# Patient Record
Sex: MALE | Race: WHITE | NOT HISPANIC OR LATINO | Employment: OTHER | URBAN - METROPOLITAN AREA
[De-identification: names, ages, dates, MRNs, and addresses within clinical notes are randomized per-mention and may not be internally consistent; named-entity substitution may affect disease eponyms.]

---

## 2021-12-03 ENCOUNTER — TRANSFERRED RECORDS (OUTPATIENT)
Dept: HEALTH INFORMATION MANAGEMENT | Facility: CLINIC | Age: 81
End: 2021-12-03

## 2022-05-05 ENCOUNTER — TRANSFERRED RECORDS (OUTPATIENT)
Dept: HEALTH INFORMATION MANAGEMENT | Facility: CLINIC | Age: 82
End: 2022-05-05

## 2022-10-11 NOTE — PROGRESS NOTES
2000 CEIOL OU Dr. Yanes  2002 SLT OU  2006 trab [facility] with vitrectomy OD Dr. Burnett  2007 secondary IOL OD Dr. Burnett  2011 SLT OS Dr. Burnett  2014 bleb revision OD Dr. Burnett    Chief Complaint/Presenting Concern: transfer of care     History of Present Illness:   Richar Ro is a 82 year old patient who presents for annual complete eye exam and to transfer glaucoma care. Feels vision is stable - poor in right eye with small area of vision in the infranasal aspect of visual field. Left eye has very good vision but some difficulty reading fine print.     Relevant Past Medical/Family/Social History: father AMD, DM    Relevant Review of Systems: negative     Diagnosis: Primary open angle glaucoma   Year diagnosis: 1990s  Previous glaucoma surgery/laser:   2000 CEIOL OU Dr. Yanes   2002 SLT OU   2006 trab [facility] with vitrectomy OD Dr. Burnett   2007 secondary IOL OD Dr. Burnett   2011 SLT OS Dr. Burnett   2014 bleb revision OD Dr. Burnett  Maximum intraocular pressure 33/44  Currently Meds:    timolol QAM OS   vyzulta QHS OS  Family history: negative  CCT: 609/559  Refractive status: Axial myopia (32mm OD)   Trauma history: negative  Steroid exposure: negative  Vasospastic disease: Migrane/Raynaud phenomenon: negative  A past hemodynamic crisis or Low BP:: negative  Meds AEs/intolerance: brimonidine, brinzolamide, dorzolamide, rocklatan, rhopressa  PMHx: Asthma and respiratory problems/Cardiac/Renal/Kidney stones/Sulfa Allergy  Anticoagulants: None  Today's testing:  Visual field October 13 2022:  Right eye - unable, reliable;  Left eye - inferior nasal step, very unreliable 85% FP  OCT Optic Nerve RNFL Spectralis October 13, 2022  right eye: poor quality, macula with thickening from ERM  left eye: poor quality, macula with thickening from ERM    Additional Ocular History:   ERM each eye   History of Hypotony right eye   Inferotemporal PCIOL subluxation right eye   Aphakia left eye      Plan/Recommendations:    Discussed findings with patient. Glaucoma ijn the setting of high myopia. S/p Trab right eye and on drops left eye. Asked the patient to share prior VF and OCT records to establish any progression. For now IOP stable, will continue same drops.     Continue Timolol QAM left eye     Continue vyzulta QHS left eye     RTC in 3-4 months      Jerrica Gonzalez MD  Resident Physician - PGY3  Department of Ophthalmology   [facility]      Physician Attestation     Attending Physician Attestation:  Complete documentation of historical and exam elements from today's encounter can be found in the full encounter summary report (not reduplicated in this progress note). I personally obtained the chief complaint(s) and history of present illness. I confirmed and edited as necessary the review of systems, past medical/surgical history, family history, social history, and examination findings as documented by others; and I examined the patient myself. I personally reviewed the relevant tests, images, and reports as documented above. I personally reviewed the ophthalmic test(s) associated with this encounter, agree with the interpretation(s) as documented by the resident/fellow and have edited the corresponding report(s) as necessary. I formulated and edited as necessary the assessment and plan and discussed the findings and management plan with the patient and any family members present at the time of the visit.  Hammad Moya M.D., Glaucoma, October 13, 2022

## 2022-10-12 DIAGNOSIS — H40.1133 PRIMARY OPEN ANGLE GLAUCOMA (POAG) OF BOTH EYES, SEVERE STAGE: Primary | ICD-10-CM

## 2022-10-13 ENCOUNTER — OFFICE VISIT (OUTPATIENT)
Dept: OPHTHALMOLOGY | Facility: CLINIC | Age: 82
End: 2022-10-13
Attending: OPHTHALMOLOGY
Payer: COMMERCIAL

## 2022-10-13 DIAGNOSIS — H40.1133 PRIMARY OPEN ANGLE GLAUCOMA (POAG) OF BOTH EYES, SEVERE STAGE: ICD-10-CM

## 2022-10-13 PROCEDURE — G0463 HOSPITAL OUTPT CLINIC VISIT: HCPCS | Mod: 25

## 2022-10-13 PROCEDURE — 76514 ECHO EXAM OF EYE THICKNESS: CPT | Performed by: OPHTHALMOLOGY

## 2022-10-13 PROCEDURE — 92133 CPTRZD OPH DX IMG PST SGM ON: CPT | Performed by: OPHTHALMOLOGY

## 2022-10-13 PROCEDURE — 92083 EXTENDED VISUAL FIELD XM: CPT | Performed by: OPHTHALMOLOGY

## 2022-10-13 PROCEDURE — 92002 INTRM OPH EXAM NEW PATIENT: CPT | Mod: GC | Performed by: OPHTHALMOLOGY

## 2022-10-13 RX ORDER — ATORVASTATIN CALCIUM 20 MG/1
20 TABLET, FILM COATED ORAL DAILY
COMMUNITY
Start: 2022-07-17

## 2022-10-13 RX ORDER — TIMOLOL MALEATE 5 MG/ML
1 SOLUTION/ DROPS OPHTHALMIC DAILY
COMMUNITY
Start: 2021-12-24 | End: 2022-10-13

## 2022-10-13 RX ORDER — LATANOPROSTENE BUNOD 0.24 MG/ML
1 SOLUTION/ DROPS OPHTHALMIC AT BEDTIME
COMMUNITY
Start: 2022-08-01 | End: 2022-10-13

## 2022-10-13 RX ORDER — LOSARTAN POTASSIUM 25 MG/1
25 TABLET ORAL DAILY
COMMUNITY
Start: 2022-10-04

## 2022-10-13 ASSESSMENT — VISUAL ACUITY
OS_SC: 20/20
METHOD: SNELLEN - LINEAR
OS_SC+: -2
OD_SC: 20/600
CORRECTION_TYPE: GLASSES
OD_PH_SC: 20/500

## 2022-10-13 ASSESSMENT — REFRACTION_WEARINGRX
OS_ADD: +2.50
SPECS_TYPE: BIFOCAL
OS_SPHERE: PLANO
OD_SPHERE: PLANO
OS_AXIS: 150
OS_CYLINDER: +1.75
OD_ADD: +2.50
OD_CYLINDER: SPHERE

## 2022-10-13 ASSESSMENT — TONOMETRY
IOP_METHOD: TONOPEN
OD_IOP_MMHG: 10
OS_IOP_MMHG: 12

## 2022-10-13 ASSESSMENT — PACHYMETRY
OS_CT(UM): 559
OD_CT(UM): 608

## 2022-10-13 ASSESSMENT — SLIT LAMP EXAM - LIDS
COMMENTS: MGD
COMMENTS: MGD

## 2022-10-13 NOTE — NURSING NOTE
Chief Complaints and History of Present Illnesses   Patient presents with     Glaucoma Evaluation     Chief Complaint(s) and History of Present Illness(es)     Glaucoma Evaluation            Laterality: both eyes    Associated symptoms: Negative for eye pain, flashes and floaters    Compliance with Treatment: always          Comments    Here for 2nd opinion glaucoma. Last eye exam was 6 months ago. Vision in the left is doing fine. No changes in vision in the right eye. Uses timolol qam left eye and vyzulta at bedtime left eye. No pain.    Fox Armando COT 8:03 AM October 13, 2022

## 2022-10-16 RX ORDER — TIMOLOL MALEATE 5 MG/ML
1 SOLUTION/ DROPS OPHTHALMIC DAILY
Qty: 15 ML | Refills: 11 | Status: SHIPPED | OUTPATIENT
Start: 2022-10-16 | End: 2024-02-07

## 2022-10-16 RX ORDER — LATANOPROSTENE BUNOD 0.24 MG/ML
1 SOLUTION/ DROPS OPHTHALMIC AT BEDTIME
Qty: 5 ML | Refills: 11 | Status: SHIPPED | OUTPATIENT
Start: 2022-10-16 | End: 2023-09-10

## 2022-11-18 ENCOUNTER — TELEPHONE (OUTPATIENT)
Dept: OPHTHALMOLOGY | Facility: CLINIC | Age: 82
End: 2022-11-18

## 2022-11-18 NOTE — TELEPHONE ENCOUNTER
Rx last sent 10-    PA team may not be able to process prior authorization until actual time needed.    Will forward to PA team to review if able to start PA process per request below for Ismael Higgins RN 9:46 AM 11/18/22

## 2022-11-18 NOTE — TELEPHONE ENCOUNTER
Prior Authorization Not Needed per Insurance    Medication: VYZULTA 0.024 % SOLN ophthalmic solution--NO PA NEEDED  Insurance Company: [employer] - Phone [phone] Fax [phone]  Expected CoPay:      Pharmacy Filling the Rx: [id number] - [place], [place] - [address] [facility]  Pharmacy Notified: Yes  Patient Notified: Yes **Instructed pharmacy to notify patient when script is ready to /ship.**    Unable to renew PA currently.

## 2022-11-18 NOTE — TELEPHONE ENCOUNTER
[facility] Call Center    Phone Message    May a detailed message be left on voicemail: yes     Reason for Call: Medication Question or concern regarding medication   Prescription Clarification  Name of Medication: VYZULTA 0.024 % SOLN ophthalmic solution  Prescribing Provider: Dr. Moya   Pharmacy: [id number] - [place], [place] - [address]   What on the order needs clarification? Pt states he received a letter from [facility] stating that the approval for this Rx is expiring soon. He is requesting to have the approval process restarted as he will need this refilled before his appointment in February. Please call pt with any questions. Thank you.    Action Taken: Message routed to:  [facility] ([facility] EYE    Travel Screening: Not Applicable

## 2023-02-14 ENCOUNTER — OFFICE VISIT (OUTPATIENT)
Dept: OPHTHALMOLOGY | Facility: CLINIC | Age: 83
End: 2023-02-14
Attending: OPHTHALMOLOGY
Payer: COMMERCIAL

## 2023-02-14 DIAGNOSIS — H40.1133 PRIMARY OPEN ANGLE GLAUCOMA (POAG) OF BOTH EYES, SEVERE STAGE: Primary | ICD-10-CM

## 2023-02-14 PROCEDURE — 92083 EXTENDED VISUAL FIELD XM: CPT | Performed by: OPHTHALMOLOGY

## 2023-02-14 PROCEDURE — G0463 HOSPITAL OUTPT CLINIC VISIT: HCPCS

## 2023-02-14 PROCEDURE — 99213 OFFICE O/P EST LOW 20 MIN: CPT | Performed by: OPHTHALMOLOGY

## 2023-02-14 ASSESSMENT — CONF VISUAL FIELD
OS_SUPERIOR_NASAL_RESTRICTION: 0
OD_INFERIOR_NASAL_RESTRICTION: 3
OS_NORMAL: 1
OS_SUPERIOR_TEMPORAL_RESTRICTION: 0
METHOD: COUNTING FINGERS
OD_SUPERIOR_NASAL_RESTRICTION: 3
OS_INFERIOR_TEMPORAL_RESTRICTION: 0
OS_INFERIOR_NASAL_RESTRICTION: 0
OD_INFERIOR_TEMPORAL_RESTRICTION: 3
OD_SUPERIOR_TEMPORAL_RESTRICTION: 3

## 2023-02-14 ASSESSMENT — REFRACTION_WEARINGRX
SPECS_TYPE: BIFOCAL
OS_SPHERE: PLANO
OS_CYLINDER: +1.75
OD_ADD: +2.50
OD_CYLINDER: SPHERE
OS_AXIS: 150
OS_ADD: +2.50
OD_SPHERE: PLANO

## 2023-02-14 ASSESSMENT — VISUAL ACUITY
OS_CC: 20/25
OD_CC: CF @ 1'
METHOD: SNELLEN - LINEAR
CORRECTION_TYPE: GLASSES

## 2023-02-14 ASSESSMENT — REFRACTION_MANIFEST
OS_ADD: +2.50
OD_ADD: +2.50
OD_CYLINDER: SPHERE
OD_SPHERE: +5.25
OS_AXIS: 145
OS_SPHERE: +0.50
OS_CYLINDER: +2.00

## 2023-02-14 ASSESSMENT — SLIT LAMP EXAM - LIDS
COMMENTS: MGD
COMMENTS: MGD

## 2023-02-14 NOTE — PROGRESS NOTES
CEIOL OU Dr. Yanes   SLT OU  2006 trab [facility] with vitrectomy OD Dr. Burnett  2007 secondary IOL OD Dr. Burnett   SLT OS Dr. Burnett   bleb revision OD Dr. Burnett    Chief Complaint/Presenting Concern: Glaucoma follow up     History of Present Illness:   Richar Ro is a 82 year old patient who presents for glaucoma follow up. Feels vision is stable - poor in right eye with small area of vision in the infranasal aspect of visual field. Left eye has very good vision but some difficulty reading fine print.     Relevant Past Medical/Family/Social History: father AMD, DM    Relevant Review of Systems: negative     Diagnosis: Primary open angle glaucoma   Year diagnosis:   Previous glaucoma surgery/laser:    CEIOL OU Dr. Yanes    SLT OU   2006 trab [facility] with vitrectomy OD Dr. Burnett    secondary IOL OD Dr. Burnett    SLT OS Dr. Burnett    bleb revision OD Dr. Burnett  Maximum intraocular pressure 33/44  Currently Meds:    timolol QAM OS   vyzulta QHS OS  Family history: negative  CCT: 609/559  Refractive status: Axial myopia (32mm OD)   Trauma history: negative  Steroid exposure: negative  Vasospastic disease: Migrane/Raynaud phenomenon: negative  A past hemodynamic crisis or Low BP:: negative  Meds AEs/intolerance: brimonidine, brinzolamide, dorzolamide, rocklatan, rhopressa  PMHx: Asthma and respiratory problems/Cardiac/Renal/Kidney stones/Sulfa Allergy  Anticoagulants: None  Prior Testing:  OCT Optic Nerve RNFL Spectralis 2022  right eye: poor quality, macula with thickening from ERM  left eye: poor quality, macula with thickening from ERM    Today's testin/17 mmHg   Visual field 23  Right eye - dense superior arcuate defect with generalized depressed VF  Left eye -  Severely constricted VF with dense superior arcuate defect.     Additional Ocular History:   ERM each eye   History of Hypotony right eye   Inferotemporal PCIOL  subluxation right eye   Aphakia left eye     Plan/Recommendations:    Discussed findings with patient. Glaucoma in the setting of high myopia. S/p Trab right eye and on drops left eye. Asked the patient to share prior VF and OCT records to establish any progression, today we have VF from 2021, which appears better than today's VF in the left eye. Patient has had fluctuating VF's, will plan to repeat the VF to confirm any progression. . For, will continue same drops.     Continue Timolol QAM left eye     Continue vyzulta QHS left eye     Repeat VF in left eye in 2 months, if confirmed worse the consider SLT left eye or BGI tube surgery     Refer to retina for evaluation of IOL subluxation. Today vision in the right eye is possibly worse than prior visit.     RTC in 2 months  VF left eye       Physician Attestation     Attending Physician Attestation:  Complete documentation of historical and exam elements from today's encounter can be found in the full encounter summary report (not reduplicated in this progress note). I personally obtained the chief complaint(s) and history of present illness. I confirmed and edited as necessary the review of systems, past medical/surgical history, family history, social history, and examination findings as documented by others; and I examined the patient myself. I personally reviewed the relevant tests, images, and reports as documented above. I personally reviewed the ophthalmic test(s) associated with this encounter. I formulated and edited as necessary the assessment and plan and discussed the findings and management plan with the patient and any family members present at the time of the visit.  Hammad Moya M.D., Glaucoma, February 15, 2023

## 2023-02-14 NOTE — NURSING NOTE
Chief Complaints and History of Present Illnesses   Patient presents with     Glaucoma Follow-Up     Chief Complaint(s) and History of Present Illness(es)     Glaucoma Follow-Up            Laterality: both eyes    Associated symptoms: Negative for eye pain, flashes and floaters    Compliance with Treatment: always          Comments    Here for POAG both eyes. Vision is stable. Compliant with drops. No flashes or floaters. No eye pain.    Fox Armando COT 9:53 AM February 14, 2023

## 2023-02-15 ASSESSMENT — TONOMETRY
IOP_METHOD: APPLANATION
OS_IOP_MMHG: 17
OD_IOP_MMHG: 11
OS_IOP_MMHG: 12
IOP_METHOD: APPLANATION
OD_IOP_MMHG: 11

## 2023-03-20 ENCOUNTER — OFFICE VISIT (OUTPATIENT)
Dept: OPHTHALMOLOGY | Facility: CLINIC | Age: 83
End: 2023-03-20
Attending: STUDENT IN AN ORGANIZED HEALTH CARE EDUCATION/TRAINING PROGRAM
Payer: COMMERCIAL

## 2023-03-20 DIAGNOSIS — H35.373 EPIRETINAL MEMBRANE (ERM) OF BOTH EYES: Primary | ICD-10-CM

## 2023-03-20 PROCEDURE — 99207 FUNDUS PHOTOS OU (BOTH EYES): CPT | Mod: 26 | Performed by: OPHTHALMOLOGY

## 2023-03-20 PROCEDURE — 92250 FUNDUS PHOTOGRAPHY W/I&R: CPT | Performed by: OPHTHALMOLOGY

## 2023-03-20 PROCEDURE — 92134 CPTRZ OPH DX IMG PST SGM RTA: CPT | Mod: 26 | Performed by: OPHTHALMOLOGY

## 2023-03-20 PROCEDURE — 92134 CPTRZ OPH DX IMG PST SGM RTA: CPT | Performed by: OPHTHALMOLOGY

## 2023-03-20 PROCEDURE — 99207 FUNDUS AUTOFLUORESCENCE IMAGE (FAF) OU (BOTH EYES): CPT | Mod: 26 | Performed by: OPHTHALMOLOGY

## 2023-03-20 PROCEDURE — 99214 OFFICE O/P EST MOD 30 MIN: CPT | Mod: GC | Performed by: OPHTHALMOLOGY

## 2023-03-20 ASSESSMENT — REFRACTION_MANIFEST
OD_AXIS: 134
OS_AXIS: 165
OD_AXIS: 125
OD_CYLINDER: +3.75
OD_SPHERE: +5.75
OD_SPHERE: +2.50
OS_SPHERE: -0.50
OD_CYLINDER: +9.50
OS_CYLINDER: +2.25

## 2023-03-20 ASSESSMENT — CONF VISUAL FIELD
OS_SUPERIOR_NASAL_RESTRICTION: 0
OS_NORMAL: 1
OS_SUPERIOR_TEMPORAL_RESTRICTION: 0
OD_SUPERIOR_TEMPORAL_RESTRICTION: 3
OD_SUPERIOR_NASAL_RESTRICTION: 1
OD_INFERIOR_TEMPORAL_RESTRICTION: 3
OS_INFERIOR_TEMPORAL_RESTRICTION: 0
OS_INFERIOR_NASAL_RESTRICTION: 0
OD_INFERIOR_NASAL_RESTRICTION: 3

## 2023-03-20 ASSESSMENT — VISUAL ACUITY
OD_CC: CF@ 2 FT
CORRECTION_TYPE: GLASSES
METHOD: SNELLEN - LINEAR
OS_CC: 20/30

## 2023-03-20 ASSESSMENT — PACHYMETRY
OS_CT(UM): 559
OD_CT(UM): 608

## 2023-03-20 ASSESSMENT — REFRACTION_WEARINGRX
OD_SPHERE: PLANO
SPECS_TYPE: BIFOCAL
OS_AXIS: 150
OS_CYLINDER: +1.75
OS_ADD: +2.50
OD_CYLINDER: SPHERE
OS_SPHERE: PLANO
OD_ADD: +2.50

## 2023-03-20 ASSESSMENT — TONOMETRY
OD_IOP_MMHG: 13
IOP_METHOD: TONOPEN
OS_IOP_MMHG: 18

## 2023-03-20 ASSESSMENT — SLIT LAMP EXAM - LIDS
COMMENTS: MGD
COMMENTS: MGD

## 2023-03-20 NOTE — PROGRESS NOTES
CC: evaluation of IOL subluxation OD; referral from Dr. Moya    HPI: Richar Ro is a 82 year old patient who follows with Dr. Moya for glaucoma follow up. She noticed worsening vision OD and is concerned for worsening subluxation of PCIOL OD. She plans to repeat VF in April and if confirmed worse will consider SLT left eye or BGI tube surgery.    Pt states vision is about the same as last visit 1 month ago. He reports subtle changes over last 1-2 years. He has been unable to use the right eye to read for years. No eye pain today. No new flashes or floaters. No redness or dryness.    Current Meds:         timolol QAM OS        vyzulta QHS OS    Past Ocular Hx  ERM each eye   History of Hypotony right eye   Inferotemporal PCIOL subluxation right eye   Aphakia left eye     2000 CEIOL OU Dr. Yanes  2002 SLT OU  2006 trab [facility] with vitrectomy OD Dr. Burnett  2007 secondary IOL OD Dr. Burnett  2011 SLT OS Dr. Burnett  2014 bleb revision OD Dr. Burnett    Imaging:  OCT 03/20/23  OD- Subretinal fibrosis, ERM, subfoveal atrophy, pseudohole, temporal macular schisis, thin choroid  OS-ERM, PEDs,     FAF 03/20/23  OD-Hypo-AF involving fovea with some hyper-AF along the borders  OS-PPA; scattered hypo- and hyper-AF lesions    Slit Lamp 03/20/23  OD-Inferotemporal subluxation of 3-piece IOL    Fundus Photos  OU consistent with exam    Assessment/plan:  # Subluxation of PCIOL OD  Recommended repositioning IOL with iris fixation vs. ACIOL per patient  He reports vision has been poor OD for at least 4 years with no significant changes recently    Risks, benefits and alternatives discussed with patient: 1:1000 risk of infection/bleed/ further loss of vision; 1:100 risk of Retinal detachment and need for further surgery.   Retinal detachments can lead to vision loss despite surgery. Discussed possibility of being unable to co Patient aware of prolonged healing after retinal surgery (up to a year after surgery)  as well as possibility of air/gas/SO  instillation into eye. Instillation of those might necessitate head positions like continuous face down positioning, which would be required for up to 7 days after surgery.  If a gas bubble is placed, both air travel and ground travel to elevated altitudes would be prohibited for up to 2-3 months afterwards. This is a training facility and residents or fellows may be involved in aspects of your surgery while under my direct observation.    Pt and wife have trip coming up and would like to follow-up after to discuss whether they are interested in pursuing the surgery at that time.      # ERM, OU  Monitor at this time    # POAG  IOP 13/18 today on current drops (Timolol QAM OS and Vyzulta QHS OS)  Meds AEs/intolerance: brimonidine, brinzolamide, dorzolamide, rocklatan, rhopressa  Continue f/u with Dr. Nita Snyder MD MPH  Vitreoretinal Fellow PGY-5  [facility]     RTC: Dr. Griffiths 3 months, Slit lamp photos OD, OCT mac OU    ~~~~~~~~~~~~~~~~~~~~~~~~~~~~~~~~~~   Complete documentation of historical and exam elements from today's encounter can be found in the full encounter summary report (not reduplicated in this progress note).  I personally obtained the chief complaint(s) and history of present illness.  I confirmed and edited as necessary the review of systems, past medical/surgical history, family history, social history, and examination findings as documented by others; and I examined the patient myself.  I personally reviewed the relevant tests, images, and reports as documented above.  I personally reviewed the ophthalmic test(s) associated with this encounter, agree with the interpretation(s) as documented by the resident/fellow, and have edited the corresponding report(s) as necessary.   I formulated and edited as necessary the assessment and plan and discussed the findings and management plan with the patient and family    Angelica Griffiths,  MD  Professor of Ophthalmology  Vitreo-Retinal surgeon   Department of Ophthalmology and Visual Neurosciences   [facility]  Phone: [phone]   Fax: [phone]

## 2023-03-20 NOTE — NURSING NOTE
Chief Complaints and History of Present Illnesses   Patient presents with     Epiretinal Membrane Evaluation     Chief Complaint(s) and History of Present Illness(es)     Epiretinal Membrane Evaluation           Comments    Pt states vision is about the same as last visit 1 month ago. No eye pain today. No new flashes or floaters. No redness or dryness.    ADRIEN Nuñez March 20, 2023 1:30 PM

## 2023-03-20 NOTE — LETTER
3/20/2023       RE: Richar Ro  [address] [facility] [address]     Dear Colleague,    Thank you for referring your patient, Richar Ro, to the [facility] [place] at [facility]. Please see a copy of my visit note below.    CC: evaluation of IOL subluxation OD; referral from Dr. Moya    HPI: Richar Ro is a 82 year old patient who follows with Dr. Moya for glaucoma follow up. She noticed worsening vision OD and is concerned for worsening subluxation of PCIOL OD. She plans to repeat VF in April and if confirmed worse will consider SLT left eye or BGI tube surgery.    Pt states vision is about the same as last visit 1 month ago. He reports subtle changes over last 1-2 years. He has been unable to use the right eye to read for years. No eye pain today. No new flashes or floaters. No redness or dryness.    Current Meds:         timolol QAM OS        vyzulta QHS OS    Past Ocular Hx  ERM each eye   History of Hypotony right eye   Inferotemporal PCIOL subluxation right eye   Aphakia left eye     2000 CEIOL OU Dr. Yanes  2002 SLT OU  2006 trab [facility] with vitrectomy OD Dr. Burnett  2007 secondary IOL OD Dr. Burnett  2011 SLT OS Dr. Burnett  2014 bleb revision OD Dr. Burnett    Imaging:  OCT 03/20/23  OD- Subretinal fibrosis, ERM, subfoveal atrophy, pseudohole, temporal macular schisis, thin choroid  OS-ERM, PEDs,     FAF 03/20/23  OD-Hypo-AF involving fovea with some hyper-AF along the borders  OS-PPA; scattered hypo- and hyper-AF lesions    Slit Lamp 03/20/23  OD-Inferotemporal subluxation of 3-piece IOL    Fundus Photos  OU consistent with exam    Assessment/plan:  # Subluxation of PCIOL OD  Recommended repositioning IOL with iris fixation vs. ACIOL per patient  He reports vision has been poor OD for at least 4 years with no significant changes recently    Risks, benefits and alternatives discussed with patient:  1:1000 risk of infection/bleed/ further loss of vision; 1:100 risk of Retinal detachment and need for further surgery.   Retinal detachments can lead to vision loss despite surgery. Discussed possibility of being unable to co Patient aware of prolonged healing after retinal surgery (up to a year after surgery) as well as possibility of air/gas/SO  instillation into eye. Instillation of those might necessitate head positions like continuous face down positioning, which would be required for up to 7 days after surgery.  If a gas bubble is placed, both air travel and ground travel to elevated altitudes would be prohibited for up to 2-3 months afterwards. This is a training facility and residents or fellows may be involved in aspects of your surgery while under my direct observation.    Pt and wife have trip coming up and would like to follow-up after to discuss whether they are interested in pursuing the surgery at that time.      # ERM, OU  Monitor at this time    # POAG  IOP 13/18 today on current drops (Timolol QAM OS and Vyzulta QHS OS)  Meds AEs/intolerance: brimonidine, brinzolamide, dorzolamide, rocklatan, rhopressa  Continue f/u with Dr. Moya      RTC: Dr. Griffiths 3 months, Slit lamp photos OD, OCT mac OU    Angelica Griffiths MD  Professor of Ophthalmology  Vitreo-Retinal surgeon   Department of Ophthalmology and Visual Neurosciences   [facility]  Phone: [phone]   Fax: [phone]

## 2023-04-26 DIAGNOSIS — H40.1133 PRIMARY OPEN ANGLE GLAUCOMA (POAG) OF BOTH EYES, SEVERE STAGE: Primary | ICD-10-CM

## 2023-04-27 ENCOUNTER — OFFICE VISIT (OUTPATIENT)
Dept: OPHTHALMOLOGY | Facility: CLINIC | Age: 83
End: 2023-04-27
Attending: STUDENT IN AN ORGANIZED HEALTH CARE EDUCATION/TRAINING PROGRAM
Payer: COMMERCIAL

## 2023-04-27 DIAGNOSIS — H27.111 SUBLUXATION OF RIGHT LENS: ICD-10-CM

## 2023-04-27 DIAGNOSIS — H40.1133 PRIMARY OPEN ANGLE GLAUCOMA (POAG) OF BOTH EYES, SEVERE STAGE: Primary | ICD-10-CM

## 2023-04-27 PROCEDURE — 92083 EXTENDED VISUAL FIELD XM: CPT | Performed by: OPHTHALMOLOGY

## 2023-04-27 PROCEDURE — 99214 OFFICE O/P EST MOD 30 MIN: CPT | Performed by: OPHTHALMOLOGY

## 2023-04-27 PROCEDURE — G0463 HOSPITAL OUTPT CLINIC VISIT: HCPCS | Performed by: OPHTHALMOLOGY

## 2023-04-27 ASSESSMENT — REFRACTION_WEARINGRX
OD_ADD: +2.50
OD_SPHERE: PLANO
OS_SPHERE: PLANO
SPECS_TYPE: BIFOCAL
OD_CYLINDER: SPHERE
OS_AXIS: 150
OS_ADD: +2.50
OS_CYLINDER: +1.75

## 2023-04-27 ASSESSMENT — TONOMETRY
OS_IOP_MMHG: 18
OD_IOP_MMHG: 11
OS_IOP_MMHG: 16
IOP_METHOD: TONOPEN
IOP_METHOD: APPLANATION
OD_IOP_MMHG: 11

## 2023-04-27 ASSESSMENT — CONF VISUAL FIELD
OS_INFERIOR_NASAL_RESTRICTION: 0
OD_SUPERIOR_TEMPORAL_RESTRICTION: 3
OD_INFERIOR_NASAL_RESTRICTION: 3
OS_SUPERIOR_NASAL_RESTRICTION: 0
OD_SUPERIOR_NASAL_RESTRICTION: 1
OS_SUPERIOR_TEMPORAL_RESTRICTION: 0
OS_INFERIOR_TEMPORAL_RESTRICTION: 0
OD_INFERIOR_TEMPORAL_RESTRICTION: 3
OS_NORMAL: 1

## 2023-04-27 ASSESSMENT — SLIT LAMP EXAM - LIDS
COMMENTS: MGD
COMMENTS: MGD

## 2023-04-27 ASSESSMENT — VISUAL ACUITY
METHOD: SNELLEN - LINEAR
OS_CC: 20/25
OD_CC: CF@1'
CORRECTION_TYPE: GLASSES

## 2023-04-27 NOTE — PROGRESS NOTES
CEIOL OU Dr. Yanes   SLT OU  2006 trab [facility] with vitrectomy OD Dr. Burnett  2007 secondary IOL OD Dr. Burnett   SLT OS Dr. Burnett   bleb revision OD Dr. Burnett    Chief Complaint/Presenting Concern: Glaucoma follow up     History of Present Illness:   Richar Ro is a 82 year old patient who presents for glaucoma follow up. Feels vision is stable - poor in right eye with small area of vision in the infranasal aspect of visual field. Left eye has very good vision but some difficulty reading fine print.     Relevant Past Medical/Family/Social History: father AMD, DM    Relevant Review of Systems: negative     Diagnosis: Primary open angle glaucoma   Year diagnosis:   Previous glaucoma surgery/laser:    CEIOL OU Dr. Yanes    SLT OU   2006 trab [facility] with vitrectomy OD Dr. Burnett    secondary IOL OD Dr. Burnett    SLT OS Dr. Burnett    bleb revision OD Dr. Burnett  Maximum intraocular pressure 33/44  Currently Meds:    timolol QAM OS   vyzulta QHS OS  Family history: negative  CCT: 609/559  Refractive status: Axial myopia (32mm OD)   Trauma history: negative  Steroid exposure: negative  Vasospastic disease: Migrane/Raynaud phenomenon: negative  A past hemodynamic crisis or Low BP:: negative  Meds AEs/intolerance: brimonidine, brinzolamide, dorzolamide, rocklatan, rhopressa  PMHx: Asthma and respiratory problems/Cardiac/Renal/Kidney stones/Sulfa Allergy  Anticoagulants: None  Prior Testing:  OCT Optic Nerve RNFL Spectralis 2022  right eye: poor quality, macula with thickening from ERM  left eye: poor quality, macula with thickening from ERM    Today's testin/18 mmHg     Additional Ocular History:   2. Aphakia left eye     3. Inferotemporal PCIOL subluxation right eye     4. ERM each eye   5. History of Hypotony right eye     Plan/Recommendations:    Discussed findings with patient. Glaucoma in the setting of high myopia. S/p Trab right  eye and on drops left eye. Asked the patient to share prior VF and OCT records to establish any progression, today we have VF from 2021, which appears better than today's VF in the left eye. Patient has had fluctuating VF's, thus we decided to repeat left VF 04/27/2023 and it shows progression.    Will proceed with SLT left eye. If this does not lead to better IOP control, then we will proceed with BGI left eye.     Continue Timolol QAM left eye     Continue vyzulta QHS left eye     Follow up with retina for evaluation of IOL subluxation.     RTC: SLT left eye next available     Physician Attestation     Attending Physician Attestation:  Complete documentation of historical and exam elements from today's encounter can be found in the full encounter summary report (not reduplicated in this progress note). I personally obtained the chief complaint(s) and history of present illness. I confirmed and edited as necessary the review of systems, past medical/surgical history, family history, social history, and examination findings as documented by others; and I examined the patient myself. I personally reviewed the relevant tests, images, and reports as documented above. I personally reviewed the ophthalmic test(s) associated with this encounter. I formulated and edited as necessary the assessment and plan and discussed the findings and management plan with the patient and any family members present at the time of the visit.  Hammad Moya M.D., Glaucoma, April 27, 2023

## 2023-04-27 NOTE — NURSING NOTE
Chief Complaints and History of Present Illnesses   Patient presents with     Glaucoma Follow-Up     Chief Complaint(s) and History of Present Illness(es)     Glaucoma Follow-Up            Laterality: left eye          Comments    Pt. States that he is doing well. No change in VA BE. No pain BE. Has been needing to use magnifier a little more.  Dimple Randall COT 1:34 PM April 27, 2023

## 2023-05-10 ENCOUNTER — OFFICE VISIT (OUTPATIENT)
Dept: OPHTHALMOLOGY | Facility: CLINIC | Age: 83
End: 2023-05-10
Attending: OPHTHALMOLOGY
Payer: COMMERCIAL

## 2023-05-10 DIAGNOSIS — H44.2E3 BOTH EYES AFFECTED BY DEGENERATIVE MYOPIA WITH OTHER MACULOPATHY: ICD-10-CM

## 2023-05-10 DIAGNOSIS — H27.111 SUBLUXATION OF RIGHT LENS: Primary | ICD-10-CM

## 2023-05-10 PROCEDURE — 92134 CPTRZ OPH DX IMG PST SGM RTA: CPT | Performed by: OPHTHALMOLOGY

## 2023-05-10 PROCEDURE — G0463 HOSPITAL OUTPT CLINIC VISIT: HCPCS | Performed by: OPHTHALMOLOGY

## 2023-05-10 PROCEDURE — 99214 OFFICE O/P EST MOD 30 MIN: CPT | Mod: GC | Performed by: OPHTHALMOLOGY

## 2023-05-10 PROCEDURE — 92285 EXTERNAL OCULAR PHOTOGRAPHY: CPT | Performed by: OPHTHALMOLOGY

## 2023-05-10 ASSESSMENT — CONF VISUAL FIELD
OD_INFERIOR_TEMPORAL_RESTRICTION: 3
OD_INFERIOR_NASAL_RESTRICTION: 3
OS_SUPERIOR_NASAL_RESTRICTION: 3
OS_SUPERIOR_TEMPORAL_RESTRICTION: 3
OD_SUPERIOR_TEMPORAL_RESTRICTION: 1

## 2023-05-10 ASSESSMENT — SLIT LAMP EXAM - LIDS
COMMENTS: MGD
COMMENTS: MGD

## 2023-05-10 ASSESSMENT — VISUAL ACUITY
OS_CC: 20/25-3
OD_CC: CF@2'
CORRECTION_TYPE: GLASSES
METHOD: SNELLEN - LINEAR

## 2023-05-10 ASSESSMENT — REFRACTION_WEARINGRX
OD_ADD: +2.50
OS_SPHERE: PLANO
OS_AXIS: 150
OD_CYLINDER: SPHERE
OD_SPHERE: PLANO
OS_CYLINDER: +1.75
SPECS_TYPE: BIFOCAL
OS_ADD: +2.50

## 2023-05-10 ASSESSMENT — TONOMETRY
OS_IOP_MMHG: 13
OD_IOP_MMHG: 13
IOP_METHOD: TONOPEN

## 2023-05-10 NOTE — PROGRESS NOTES
CC: evaluation of IOL subluxation OD; referral from Dr. Moya    Interval: Vision has been stable; no eye pain; no flashes and floaters     He saw Dr. Moya in April who noted field progression OS and has recommended SLT. He will have that done later this month.     Current Meds:         timolol QAM OS        vyzulta QHS OS    HPI: Richar Ro is a 82 year old patient here for follow up of a dislocated lens. He was referred by Dr. Moya who also follows him for glaucoma.     Past Ocular Hx  ERM each eye   History of Hypotony right eye   Inferotemporal PCIOL subluxation right eye   Aphakia left eye     Past Ocular Surgery  2000 CEIOL OU Dr. Yanes  2002 SLT OU  2006 trab [facility]MC with vitrectomy OD Dr. Burnett  2007 secondary IOL OD Dr. Burnett  2011 SLT OS Dr. Burnett  2014 bleb revision OD Dr. Burnett    Imaging:  OCT 05/10/23  OD- Subretinal fibrosis, ERM, subfoveal atrophy, lamellar hole, temporal macular schisis and outer segment atrophy, thin choroid - stable  OS-ERM, foveal contour present, myopic contour of fundus, no fluid, PEDs,     FAF 03/20/23  OD-Hypo-AF involving fovea with some hyper-AF along the borders  OS-PPA; scattered hypo- and hyper-AF lesions    Slit Lamp 03/20/23  OD-Inferotemporal subluxation of 3-piece IOL    Fundus Photos  OU consistent with exam    Assessment/plan:    # Subluxation of PCIOL OD   -  unclear impact on vision given central atrophy. If surgical repair is successful, he will only likely recover peripheral vision, which may be limited due to his glaucoma   -  pt notes his best vision is nasally (where optic remains in VA), but he is not sure he noticed when his lens dislocated   - scleral fixation of IOL or ACIOL poses risks of interfering with his bleb w/ r/o increased IOP or hypotony post-operatively   - consider repositioning IOL with iris fixation vs. Anterior chamber intraocular lens . Patient prefers observation  He reports vision has been poor OD for at  least 4 years with no significant changes recently    Risks, benefits and alternatives discussed with patient: 1:1000 risk of infection/bleed/ further loss of vision; 1:100 risk of Retinal detachment and need for further surgery.   Retinal detachments can lead to vision loss despite surgery. Discussed possibility of being unable to co Patient aware of prolonged healing after retinal surgery (up to a year after surgery) as well as possibility of air/gas/SO  instillation into eye. Instillation of those might necessitate head positions like continuous face down positioning, which would be required for up to 7 days after surgery.  If a gas bubble is placed, both air travel and ground travel to elevated altitudes would be prohibited for up to 2-3 months afterwards. This is a training facility and residents or fellows may be involved in aspects of your surgery while under my direct observation.      Pt and wife have trip coming up and would like to follow-up after to discuss whether they are interested in pursuing the surgery at that time.    # Pathologic myopia, OU   -  A/w severe peripapillary and central atrophy OD>>OS   - lattice present OD, s/p laser barricade?    # ERM, OU  Monitor at this time    # POAG  - on IOP 13/13   - on Timolol QAM OS and Vyzulta QHS OS, no drops OD  - Meds AEs/intolerance: brimonidine, brinzolamide, dorzolamide, rocklatan, rhopressa  - follows w/ Dr. Moya      RTC:  Follow up in 6 months OCT mac OU and Optos    Molina Tong MD  Vitreoretinal Surgical Fellow, PGY6  [facility]    ~~~~~~~~~~~~~~~~~~~~~~~~~~~~~~~~~~   Complete documentation of historical and exam elements from today's encounter can be found in the full encounter summary report (not reduplicated in this progress note).  I personally obtained the chief complaint(s) and history of present illness.  I confirmed and edited as necessary the review of systems, past medical/surgical history, family history, social  history, and examination findings as documented by others; and I examined the patient myself.  I personally reviewed the relevant tests, images, and reports as documented above.  I personally reviewed the ophthalmic test(s) associated with this encounter, agree with the interpretation(s) as documented by the resident/fellow, and have edited the corresponding report(s) as necessary.   I formulated and edited as necessary the assessment and plan and discussed the findings and management plan with the patient and family    Angelica Griffiths MD  Professor of Ophthalmology  Vitreo-Retinal surgeon   Department of Ophthalmology and Visual Neurosciences   [facility]  Phone: [phone]   Fax: [phone]

## 2023-05-26 ENCOUNTER — OFFICE VISIT (OUTPATIENT)
Dept: OPHTHALMOLOGY | Facility: CLINIC | Age: 83
End: 2023-05-26
Attending: OPHTHALMOLOGY
Payer: COMMERCIAL

## 2023-05-26 DIAGNOSIS — H40.1133 PRIMARY OPEN ANGLE GLAUCOMA (POAG) OF BOTH EYES, SEVERE STAGE: Primary | ICD-10-CM

## 2023-05-26 PROCEDURE — 250N000009 HC RX 250: Performed by: OPHTHALMOLOGY

## 2023-05-26 PROCEDURE — 65855 TRABECULOPLASTY LASER SURG: CPT | Mod: LT | Performed by: OPHTHALMOLOGY

## 2023-05-26 RX ADMIN — APRACLONIDINE HYDROCHLORIDE 1 DROP: 10 SOLUTION/ DROPS OPHTHALMIC at 08:37

## 2023-05-26 ASSESSMENT — TONOMETRY
OS_IOP_MMHG: 11
OD_IOP_MMHG: 10
IOP_METHOD: TONOPEN
OS_IOP_MMHG: 13
IOP_METHOD: TONOPEN

## 2023-05-26 ASSESSMENT — SLIT LAMP EXAM - LIDS
COMMENTS: NORMAL
COMMENTS: NORMAL

## 2023-05-26 ASSESSMENT — VISUAL ACUITY
METHOD: SNELLEN - LINEAR
OS_SC+: +2
OD_SC: CF @ 1 FT
OS_SC: 20/25

## 2023-05-26 NOTE — PROGRESS NOTES
2000 CEIOL OU Dr. Yanes  2002 SLT OU  2006 trab [facility] with vitrectomy OD Dr. Burnett  2007 secondary IOL OD Dr. Burnett  2011 SLT OS Dr. Burnett  2014 bleb revision OD Dr. Burnett    Chief Complaint/Presenting Concern: Glaucoma follow up     History of Present Illness:   Richar Ro is a 82 year old patient who presents for glaucoma follow up. Feels vision is stable - poor in right eye with small area of vision in the infranasal aspect of visual field. Left eye has very good vision but some difficulty reading fine print.   - LCV 04/26/2023, decided to proceed with left eye SLT.    Today, 05/26/2023, patient states no new symptoms. Ready for procedure.     Relevant Past Medical/Family/Social History: father AMD, DM    Relevant Review of Systems: negative     Diagnosis: Primary open angle glaucoma   Year diagnosis: 1990s  Previous glaucoma surgery/laser:   2000 CEIOL OU Dr. Yanes   2002 SLT OU   2006 trab [facility] with vitrectomy OD Dr. Burnett   2007 secondary IOL OD Dr. Burnett   2011 SLT OS Dr. Burnett   2014 bleb revision OD Dr. Burnett  Maximum intraocular pressure 33/44  Currently Meds:    timolol QAM OS   vyzulta QHS OS  Family history: negative  CCT: 609/559  Refractive status: Axial myopia (32mm OD)   Trauma history: negative  Steroid exposure: negative  Vasospastic disease: Migrane/Raynaud phenomenon: negative  A past hemodynamic crisis or Low BP:: negative  Meds AEs/intolerance: brimonidine, brinzolamide, dorzolamide, rocklatan, rhopressa  PMHx: Asthma and respiratory problems/Cardiac/Renal/Kidney stones/Sulfa Allergy  Anticoagulants: None  Prior Testing:  OCT Optic Nerve RNFL Spectralis October 13, 2022  right eye: poor quality, macula with thickening from ERM  left eye: poor quality, macula with thickening from ERM    Today's testing:   IOP left eye: 11 mmHg pre SLT, 11 post-SLT    Additional Ocular History:   2. Aphakia left eye     3. Inferotemporal PCIOL subluxation right eye     4.  ERM each eye   5. History of Hypotony right eye     Plan/Recommendations:    Discussed findings with patient. Glaucoma in the setting of high myopia. S/p Trab right eye and on drops left eye. Asked the patient to share prior VF and OCT records to establish any progression, today we have VF from 2021, which appears better than today's VF in the left eye. Patient has had fluctuating VF's, thus we decided to repeat left VF 04/27/2023 and it shows progression.  Will proceed with SLT left eye today. If this does not lead to better IOP control, then we will proceed with BGI left eye.   Risks and benefits of selective laser trabeculoplasty (SLT) of the LEFT eye, including transient elevation in intraocular pressure, minor hyphema discussed. Patient expressed understanding and wished to proceed. SLT done without complication. Will assess response in 6 weeks.     Continue Timolol QAM left eye     Continue Vyzulta QHS left eye     RTC: 2 weeks IOP check then 6 weeks VA, IOP     Physician Attestation     Attending Physician Attestation:  Complete documentation of historical and exam elements from today's encounter can be found in the full encounter summary report (not reduplicated in this progress note). I personally obtained the chief complaint(s) and history of present illness. I confirmed and edited as necessary the review of systems, past medical/surgical history, family history, social history, and examination findings as documented by others; and I examined the patient myself. I personally reviewed the relevant tests, images, and reports as documented above. I personally reviewed the ophthalmic test(s) associated with this encounter. I formulated and edited as necessary the assessment and plan and discussed the findings and management plan with the patient and any family members present at the time of the visit.  Hammad Moya M.D., Glaucoma, May 26, 2023

## 2023-06-06 ENCOUNTER — OFFICE VISIT (OUTPATIENT)
Dept: OPHTHALMOLOGY | Facility: CLINIC | Age: 83
End: 2023-06-06
Attending: OPHTHALMOLOGY
Payer: COMMERCIAL

## 2023-06-06 DIAGNOSIS — Z98.890 POSTOPERATIVE EYE STATE: Primary | ICD-10-CM

## 2023-06-06 DIAGNOSIS — H40.1133 PRIMARY OPEN ANGLE GLAUCOMA (POAG) OF BOTH EYES, SEVERE STAGE: ICD-10-CM

## 2023-06-06 PROCEDURE — 99024 POSTOP FOLLOW-UP VISIT: CPT | Mod: GC | Performed by: OPHTHALMOLOGY

## 2023-06-06 PROCEDURE — G0463 HOSPITAL OUTPT CLINIC VISIT: HCPCS | Performed by: OPHTHALMOLOGY

## 2023-06-06 ASSESSMENT — CONF VISUAL FIELD
OD_SUPERIOR_NASAL_RESTRICTION: 1
OD_SUPERIOR_TEMPORAL_RESTRICTION: 3
OD_INFERIOR_NASAL_RESTRICTION: 3
OD_INFERIOR_TEMPORAL_RESTRICTION: 3

## 2023-06-06 ASSESSMENT — SLIT LAMP EXAM - LIDS
COMMENTS: NORMAL
COMMENTS: NORMAL

## 2023-06-06 ASSESSMENT — VISUAL ACUITY
OS_CC: 20/20
OS_CC+: -2
METHOD: SNELLEN - LINEAR
OD_CC: CF 3'

## 2023-06-06 ASSESSMENT — TONOMETRY
OS_IOP_MMHG: 15
IOP_METHOD: TONOPEN
OD_IOP_MMHG: 16

## 2023-06-06 NOTE — NURSING NOTE
Chief Complaints and History of Present Illnesses   Patient presents with     Glaucoma Follow-Up     Chief Complaint(s) and History of Present Illness(es)     Glaucoma Follow-Up            Laterality: both eyes    Associated symptoms: Negative for eye pain, redness, flashes and floaters    Pain scale: 0/10          Comments    Patient denies any changes since last visit.   ROSALBA Hightower June 6, 2023 8:17 AM

## 2023-06-06 NOTE — PROGRESS NOTES
2000 CEIOL OU Dr. Yanes  2002 SLT OU  2006 trab [facility] with vitrectomy OD Dr. Burnett  2007 secondary IOL OD Dr. Burnett  2011 SLT OS Dr. Burnett  2014 bleb revision OD Dr. Burnett    Chief Complaint/Presenting Concern: Glaucoma follow up     History of Present Illness:   Richar Ro is a 83 year old patient who presents for glaucoma follow up. Feels vision is stable - poor in right eye with small area of vision in the infranasal aspect of visual field. Left eye has very good vision but some difficulty reading fine print.   - 05/26/2023: left eye SLT  Today, 06/06/2023, patient states no new symptoms. Intermittent tearing from both eyes right > left. Using timolol and Vyzulta.     Relevant Past Medical/Family/Social History: father AMD, DM    Relevant Review of Systems: negative     Diagnosis: Primary open angle glaucoma   Year diagnosis: 1990s  Previous glaucoma surgery/laser:   2000 CEIOL OU Dr. Yanes   2002 SLT OU   2006 trab [facility] with vitrectomy OD Dr. Burnett   2007 secondary IOL OD Dr. Burnett   2011 SLT OS Dr. Burnett   2014 bleb revision OD Dr. Burnett   05/26/2023: SLT left eye  Maximum intraocular pressure 33/44  Currently Meds:    timolol QAM OS   vyzulta QHS OS  Family history: negative  CCT: 609/559  Refractive status: Axial myopia (32mm OD)   Trauma history: negative  Steroid exposure: negative  Vasospastic disease: Migrane/Raynaud phenomenon: negative  A past hemodynamic crisis or Low BP:: negative  Meds AEs/intolerance: brimonidine, brinzolamide, dorzolamide, rocklatan, rhopressa  PMHx: Asthma and respiratory problems/Cardiac/Renal/Kidney stones/Sulfa Allergy  Anticoagulants: None  Prior Testing:  OCT Optic Nerve RNFL Spectralis October 13, 2022  right eye: poor quality, macula with thickening from ERM  left eye: poor quality, macula with thickening from ERM    Today's testing:   IOP: 15/14 mmHg applanation   Rare cell only left eye    Additional Ocular History:   2. Aphakia  left eye     3. Inferotemporal PCIOL subluxation right eye     4. ERM each eye   5. History of Hypotony right eye     Plan/Recommendations:    Discussed findings with patient. Glaucoma in the setting of high myopia. S/p Trab right eye and on drops left eye. Asked the patient to share prior VF and OCT records to establish any progression, today we have VF from 2021, which appears better than today's VF in the left eye. Patient has had fluctuating VF's, thus we decided to repeat left VF 04/27/2023 and it shows progression.  Underwent SLT left eye 05/26/2023. Today IOP 14 already better than 18 pre-SLT. No inflammation.     Continue Timolol QAM left eye     Continue Vyzulta QHS left eye     RTC: 4 weeks VA, IOP     Physician Attestation     Attending Physician Attestation:  Complete documentation of historical and exam elements from today's encounter can be found in the full encounter summary report (not reduplicated in this progress note). I personally obtained the chief complaint(s) and history of present illness. I confirmed and edited as necessary the review of systems, past medical/surgical history, family history, social history, and examination findings as documented by others; and I examined the patient myself. I personally reviewed the relevant tests, images, and reports as documented above. I formulated and edited as necessary the assessment and plan and discussed the findings and management plan with the patient and any family members present at the time of the visit.  Hammad Moya M.D., Glaucoma, June 6, 2023

## 2023-07-06 ENCOUNTER — OFFICE VISIT (OUTPATIENT)
Dept: OPHTHALMOLOGY | Facility: CLINIC | Age: 83
End: 2023-07-06
Attending: OPHTHALMOLOGY
Payer: COMMERCIAL

## 2023-07-06 DIAGNOSIS — H27.111 SUBLUXATION OF RIGHT LENS: ICD-10-CM

## 2023-07-06 DIAGNOSIS — H40.1133 PRIMARY OPEN ANGLE GLAUCOMA (POAG) OF BOTH EYES, SEVERE STAGE: Primary | ICD-10-CM

## 2023-07-06 PROCEDURE — G0463 HOSPITAL OUTPT CLINIC VISIT: HCPCS | Performed by: OPHTHALMOLOGY

## 2023-07-06 PROCEDURE — 99213 OFFICE O/P EST LOW 20 MIN: CPT | Mod: GC | Performed by: OPHTHALMOLOGY

## 2023-07-06 ASSESSMENT — REFRACTION_WEARINGRX
OD_ADD: +2.50
OS_SPHERE: PLANO
OD_SPHERE: PLANO
OS_ADD: +2.50
SPECS_TYPE: BIFOCAL
OS_AXIS: 150
OS_CYLINDER: +1.75
OD_CYLINDER: SPHERE

## 2023-07-06 ASSESSMENT — CONF VISUAL FIELD
OD_INFERIOR_TEMPORAL_RESTRICTION: 1
OS_INFERIOR_NASAL_RESTRICTION: 0
OS_SUPERIOR_TEMPORAL_RESTRICTION: 0
OD_INFERIOR_NASAL_RESTRICTION: 3
OD_SUPERIOR_TEMPORAL_RESTRICTION: 1
OS_INFERIOR_TEMPORAL_RESTRICTION: 0
OS_NORMAL: 1
OD_SUPERIOR_NASAL_RESTRICTION: 1
METHOD: COUNTING FINGERS
OS_SUPERIOR_NASAL_RESTRICTION: 0

## 2023-07-06 ASSESSMENT — TONOMETRY
IOP_METHOD: APPLANATION
OD_IOP_MMHG: 13
OS_IOP_MMHG: 14
OS_IOP_MMHG: 14
IOP_METHOD: APPLANATION
OD_IOP_MMHG: 12

## 2023-07-06 ASSESSMENT — VISUAL ACUITY
METHOD: SNELLEN - LINEAR
OS_CC+: +2
CORRECTION_TYPE: GLASSES
OD_CC: 5/200 E
OS_CC: 20/25

## 2023-07-06 ASSESSMENT — SLIT LAMP EXAM - LIDS
COMMENTS: NORMAL
COMMENTS: NORMAL

## 2023-07-06 NOTE — NURSING NOTE
Chief Complaints and History of Present Illnesses   Patient presents with     Glaucoma Follow Up     1 month follow up both eyes.     Chief Complaint(s) and History of Present Illness(es)     Glaucoma Follow Up            Comments: 1 month follow up both eyes.          Comments    Pt states vision is about the same as last visit. No new eye pain.   No new redness or dryness.    ADRIEN Nuñez July 6, 2023 8:04 AM

## 2023-07-06 NOTE — PROGRESS NOTES
2000 CEIOL OU Dr. Yanes  2002 SLT OU  2006 trab [facility] with vitrectomy OD Dr. Burnett  2007 secondary IOL OD Dr. Burnett  2011 SLT OS Dr. Burnett  2014 bleb revision OD Dr. Burnett  2023: SLT left eye    Chief Complaint/Presenting Concern: Glaucoma follow up     History of Present Illness:   Richar Ro is a 83 year old patient who presents for glaucoma follow up.   Today, 07/06/2023, patient states no new symptoms. Feels stable. Using timolol daily OS and Vyzulta at bedtime OU.   S/p 6 weeks ago SLT 05/26/2023: SLT left eye    Relevant Past Medical/Family/Social History: father AMD, DM    Relevant Review of Systems: negative     Diagnosis: Primary open angle glaucoma   Year diagnosis: 1990s  Previous glaucoma surgery/laser:   2000 CEIOL OU Dr. Yanes   2002 SLT OU   2006 trab [facility] with vitrectomy OD Dr. Burnett   2007 secondary IOL OD Dr. Burnett   2011 SLT OS Dr. Burnett   2014 bleb revision OD Dr. Burnett   05/26/2023: SLT left eye  Maximum intraocular pressure 33/44  Currently Meds:    timolol QAM OS   vyzulta QHS OU  Family history: negative  CCT: 609/559  Refractive status: Axial myopia (32mm OD)   Trauma history: negative  Steroid exposure: negative  Vasospastic disease: Migrane/Raynaud phenomenon: negative  A past hemodynamic crisis or Low BP:: negative  Meds AEs/intolerance: brimonidine, brinzolamide, dorzolamide, rocklatan, rhopressa  PMHx: Asthma and respiratory problems/Cardiac/Renal/Kidney stones/Sulfa Allergy  Anticoagulants: None  Prior Testing:  OCT Optic Nerve RNFL Spectralis October 13, 2022  right eye: poor quality, macula with thickening from ERM  left eye: poor quality, macula with thickening from ERM    Today's testing:   IOP: 15/14 mmHg applanation   Rare cell only left eye    Additional Ocular History:   2. Aphakia left eye     3. Inferotemporal PCIOL subluxation right eye     4. ERM each eye   5. History of Hypotony right eye     Plan/Recommendations:    Discussed  findings with patient. Glaucoma in the setting of high myopia. S/p Trab right eye and on drops left eye. Progression on left eye VF 4/27/23. S/p SLT in the left eye 5/26/23.   IOP improved in left eye today    Continue Timolol QAM left eye     Continue Vyzulta QHS both eyes    Follow up with Dr. Griffiths for subluxed IOL right eye     RTC: 3  Months VA, IOP, VF     Physician Attestation     Attending Physician Attestation:  Complete documentation of historical and exam elements from today's encounter can be found in the full encounter summary report (not reduplicated in this progress note). I personally obtained the chief complaint(s) and history of present illness. I confirmed and edited as necessary the review of systems, past medical/surgical history, family history, social history, and examination findings as documented by others; and I examined the patient myself. I personally reviewed the relevant tests, images, and reports as documented above. I formulated and edited as necessary the assessment and plan and discussed the findings and management plan with the patient and any family members present at the time of the visit.  Hammad Moya M.D., Glaucoma, July 6, 2023

## 2023-07-18 DIAGNOSIS — H35.373 EPIRETINAL MEMBRANE (ERM) OF BOTH EYES: Primary | ICD-10-CM

## 2023-07-26 ENCOUNTER — OFFICE VISIT (OUTPATIENT)
Dept: OPHTHALMOLOGY | Facility: CLINIC | Age: 83
End: 2023-07-26
Attending: OPHTHALMOLOGY
Payer: COMMERCIAL

## 2023-07-26 DIAGNOSIS — H35.373 EPIRETINAL MEMBRANE (ERM) OF BOTH EYES: ICD-10-CM

## 2023-07-26 DIAGNOSIS — T85.22XA DISLOCATION OF INTRAOCULAR LENS, INITIAL ENCOUNTER: Primary | ICD-10-CM

## 2023-07-26 PROCEDURE — 99213 OFFICE O/P EST LOW 20 MIN: CPT | Mod: GC | Performed by: OPHTHALMOLOGY

## 2023-07-26 PROCEDURE — 76516 ECHO EXAM OF EYE: CPT | Performed by: OPHTHALMOLOGY

## 2023-07-26 PROCEDURE — 76519 ECHO EXAM OF EYE: CPT | Performed by: OPHTHALMOLOGY

## 2023-07-26 PROCEDURE — 92134 CPTRZ OPH DX IMG PST SGM RTA: CPT | Performed by: OPHTHALMOLOGY

## 2023-07-26 PROCEDURE — G0463 HOSPITAL OUTPT CLINIC VISIT: HCPCS | Performed by: OPHTHALMOLOGY

## 2023-07-26 PROCEDURE — 99207 FUNDUS PHOTOS OU (BOTH EYES): CPT | Mod: 26 | Performed by: OPHTHALMOLOGY

## 2023-07-26 PROCEDURE — 92250 FUNDUS PHOTOGRAPHY W/I&R: CPT | Performed by: OPHTHALMOLOGY

## 2023-07-26 ASSESSMENT — VISUAL ACUITY
CORRECTION_TYPE: GLASSES
OS_CC+: -2
METHOD: SNELLEN - LINEAR
OS_CC: 20/25
OD_CC: 5/200 E

## 2023-07-26 ASSESSMENT — REFRACTION_WEARINGRX
OS_CYLINDER: +1.75
OS_SPHERE: PLANO
OD_CYLINDER: SPHERE
OS_ADD: +2.50
OS_AXIS: 150
OD_ADD: +2.50
SPECS_TYPE: BIFOCAL
OD_SPHERE: PLANO

## 2023-07-26 ASSESSMENT — TONOMETRY
OD_IOP_MMHG: 14
OS_IOP_MMHG: 13
IOP_METHOD: TONOPEN

## 2023-07-26 ASSESSMENT — SLIT LAMP EXAM - LIDS
COMMENTS: MGD
COMMENTS: MGD

## 2023-07-26 NOTE — NURSING NOTE
Chief Complaints and History of Present Illnesses   Patient presents with    Subluxation Of The Lens Follow Up     Follow up Subluxation of right lens. Last seen May 10, 2023.      Chief Complaint(s) and History of Present Illness(es)       Subluxation Of The Lens Follow Up              Laterality: right eye    Associated symptoms: blurred vision    Treatments tried: eye drops and artificial tears    Pain scale: 0/10    Comments: Follow up Subluxation of right lens. Last seen May 10, 2023.               Comments    Pt reports no new changes in vision, since last seen.   Blurriness is still consistent, right eye.   Minor blurriness in left eye.  Denies flashes/floaters/double vision. BE  Is compliant with drops and taking as prescribed; lgtts 6: 30 am    SABIHA PARADA, July 26, 2023

## 2023-07-26 NOTE — PROGRESS NOTES
CC: History of IOL subluxation OD; referral from Dr. Moya    Interval: Vision has been stable; no eye pain; no flashes and floaters     He saw Dr. Moya in April who noted field progression OS and has recommended SLT. He will have that done later this month.     Current Meds:         timolol QAM OS        vyzulta QHS OS    HPI: Richar Ro is a 83 year old patient here for follow up of a dislocated lens. He was referred by Dr. Moya who also follows him for glaucoma.     Past Ocular Hx  ERM each eye   History of Hypotony right eye   Inferotemporal PCIOL subluxation right eye   Aphakia left eye     Past Ocular Surgery  2000 CEIOL OU Dr. Yanes  2002 SLT OU  2006 trab [facility] with vitrectomy OD Dr. Burnett  2007 secondary IOL OD Dr. Burnett  2011 SLT OS Dr. Burnett  2014 bleb revision OD Dr. Burnett    Imaging:  OCT 07/26/23   OD- Subretinal fibrosis, ERM, subfoveal atrophy, lamellar hole, temporal macular schisis and outer segment atrophy, thin choroid - stable  OS-Epiretinal membrane with mild foveal traction, foveal contour present, myopic contour of fundus, no fluid, PEDs,     FAF 07/26/23   OD-Hypo-AF involving fovea with some hyper-AF along the borders; peripheral hypoautofluorescence nasally  OS-PPA; scattered hypo- and hyper-AF lesions    Slit Lamp 03/20/23  OD-Inferotemporal subluxation of 3-piece IOL    Fundus Photos 07/26/23   OU consistent with exam    Assessment/plan:    # Subluxation of PCIOL OD   -  unclear impact on vision given central atrophy. If surgical repair is successful, he will only likely recover peripheral vision, which may be limited due to his glaucoma   -  pt notes his best vision is nasally (where optic remains in VA), but he is not sure he noticed when his lens dislocated   - scleral fixation of IOL or ACIOL poses risks of interfering with his bleb w/ r/o increased IOP or hypotony post-operatively     - plan for  repositioning IOL with iris fixation vs. Anterior  chamber intraocular lens vs Yamane right eye   Possible 25 g Pars plana vitrectomy (PPV)/ endolaser   He reports vision has been poor OD for at least 4 years with no significant changes recently    Time for surgery 1.5 hrs  Anesthesia possible macand peribulbar block     Risks, benefits and alternatives discussed with patient: 1:1000 risk of infection/bleed/ further loss of vision; 1:100 risk of Retinal detachment and need for further surgery.   Retinal detachments can lead to vision loss despite surgery. Discussed possibility of being unable to salvage lens Patient aware of prolonged healing after retinal surgery (up to a year after surgery) as well as possibility of air/gas/SO  instillation into eye. Instillation of those might necessitate head positions like continuous face down positioning, which would be required for up to 7 days after surgery.  If a gas bubble is placed, both air travel and ground travel to elevated altitudes would be prohibited for up to 2-3 months afterwards. This is a training facility and residents or fellows may be involved in aspects of your surgery while under my direct observation.    VA limited because of  history of myopic degeneration    # Pathologic myopia, OU   -  A/w severe peripapillary and central atrophy OD>>OS   - lattice present OD, s/p laser barricade?    # ERM, OU  Monitor at this time    # POAG  - on IOP 13/13   - on Timolol QAM OS and Vyzulta QHS OS, no drops OD  - Meds AEs/intolerance: brimonidine, brinzolamide, dorzolamide, rocklatan, rhopressa  - follows w/ Dr. Moya    # ptosis right eye   Chronic; reports 3 prior fail surgeries at [facility]    Follow up: after surgery    Thank you for entrusting us with your care  Viri Guerra MD, PGY3  Ophthalmology Resident  [facility]  ~~~~~~~~~~~~~~~~~~~~~~~~~~~~~~~~~~   Complete documentation of historical and exam elements from today's encounter can be found in the full encounter summary report (not reduplicated in  this progress note).  I personally obtained the chief complaint(s) and history of present illness.  I confirmed and edited as necessary the review of systems, past medical/surgical history, family history, social history, and examination findings as documented by others; and I examined the patient myself.  I personally reviewed the relevant tests, images, and reports as documented above.  I personally reviewed the ophthalmic test(s) associated with this encounter, agree with the interpretation(s) as documented by the resident/fellow, and have edited the corresponding report(s) as necessary.   I formulated and edited as necessary the assessment and plan and discussed the findings and management plan with the patient and family    Angelica Griffiths MD  Professor of Ophthalmology  Vitreo-Retinal surgeon   Department of Ophthalmology and Visual Neurosciences   [facility]  Phone: [phone]   Fax: [phone]

## 2023-07-27 NOTE — PROGRESS NOTES
Pt called back yesterday and stated he was not given a lens card from his cataract surgeries in 2000 (or cannot find them at this time, at least).    I suggested he call the office where his surgeries were performed to try to obtain the information. He called back today and stated they only keep their records for 7-10 years, so they also do not have the lens information.    I called [facility] and requested that they send us the oldest note they have available, in the hopes that the note might have the lens information listed in the note itself. If it is not in the note, they suggested we could speak with their triage team to review the notes and see if they are able to find it.    Tika Galicia COA 11:45 AM July 27, 2023

## 2023-07-31 ENCOUNTER — TELEPHONE (OUTPATIENT)
Dept: OPHTHALMOLOGY | Facility: CLINIC | Age: 83
End: 2023-07-31

## 2023-07-31 NOTE — TELEPHONE ENCOUNTER
Health Call Center    Phone Message    May a detailed message be left on voicemail: yes     Reason for Call: Other: Pt has a couple of things he would like to discuss with Dr. Moya prior to making a decision on surgery. He says that the plan is for surgery on his Rt eye, which he says is his bad eye. He says he is concerned more about his Lt eye which is his good eye. He is also wondering if Dr. Moya or Dr. Griffiths would suggest he have this surgery sooner rather than later. Please call pt to discuss. Thank you.    Action Taken: Message routed to:  [facility] E[facility]    Travel Screening: Not Applicable

## 2023-08-11 ENCOUNTER — TELEPHONE (OUTPATIENT)
Dept: OPHTHALMOLOGY | Facility: CLINIC | Age: 83
End: 2023-08-11
Payer: COMMERCIAL

## 2023-08-11 NOTE — TELEPHONE ENCOUNTER
I called Richar to schedule surgery with Dr. Angelica Griffiths, I left a voicemail with callback # [phone].

## 2023-08-18 ENCOUNTER — TELEPHONE (OUTPATIENT)
Dept: OPHTHALMOLOGY | Facility: CLINIC | Age: 83
End: 2023-08-18
Payer: COMMERCIAL

## 2023-08-18 PROBLEM — T85.22XA DISLOCATION OF INTRAOCULAR LENS, INITIAL ENCOUNTER: Status: ACTIVE | Noted: 2023-07-26

## 2023-08-18 NOTE — TELEPHONE ENCOUNTER
Patient is scheduled for surgery with Dr. Angelica Griffiths     Spoke with: Richar     Date of Surgery: 09/05     Location: [facility]:  [address] [place] [id number]     H&P will be completed at: PCP or Richar will call back to schedule with PAC     Post Op scheduled on 09/06, 09/13, and 10/05     Surgery packet was mailed 08/18     Additional comments: Advised RN will call 1 - 3 business days prior with arrival time and instructions. Informed patient they will need an adult  and responsible adult to stay with for 24 hours following surgery

## 2023-08-21 ENCOUNTER — TELEPHONE (OUTPATIENT)
Dept: OPHTHALMOLOGY | Facility: CLINIC | Age: 83
End: 2023-08-21
Payer: COMMERCIAL

## 2023-08-21 NOTE — TELEPHONE ENCOUNTER
Returned Richar's voicemail from this morning to answer his questions about his upcoming surgery in a few weeks. Left a voicemail with callback number [phone].     I assured Richar I placed his surgery packet in the mail on the Friday we spoke. I explained the letter then would have been picked up by [employer] the following Monday and he should be receiving the packet any day now.

## 2023-09-01 ENCOUNTER — ANESTHESIA EVENT (OUTPATIENT)
Dept: SURGERY | Facility: AMBULATORY SURGERY CENTER | Age: 83
End: 2023-09-01
Payer: COMMERCIAL

## 2023-09-05 ENCOUNTER — ANESTHESIA (OUTPATIENT)
Dept: SURGERY | Facility: AMBULATORY SURGERY CENTER | Age: 83
End: 2023-09-05
Payer: COMMERCIAL

## 2023-09-05 ENCOUNTER — HOSPITAL ENCOUNTER (OUTPATIENT)
Facility: AMBULATORY SURGERY CENTER | Age: 83
Discharge: HOME | End: 2023-09-05
Attending: OPHTHALMOLOGY
Payer: COMMERCIAL

## 2023-09-05 VITALS
RESPIRATION RATE: 16 BRPM | SYSTOLIC BLOOD PRESSURE: 127 MMHG | WEIGHT: 185 LBS | TEMPERATURE: 97 F | BODY MASS INDEX: 29.73 KG/M2 | HEART RATE: 54 BPM | HEIGHT: 66 IN | OXYGEN SATURATION: 98 % | DIASTOLIC BLOOD PRESSURE: 53 MMHG

## 2023-09-05 DIAGNOSIS — T85.22XA DISLOCATION OF INTRAOCULAR LENS, INITIAL ENCOUNTER: Primary | ICD-10-CM

## 2023-09-05 DIAGNOSIS — Z48.810 AFTERCARE FOLLOWING SURGERY OF A SENSE ORGAN: ICD-10-CM

## 2023-09-05 PROCEDURE — 66986 EXCHANGE LENS PROSTHESIS: CPT | Mod: RT

## 2023-09-05 PROCEDURE — 66986 EXCHANGE LENS PROSTHESIS: CPT | Mod: RT | Performed by: OPHTHALMOLOGY

## 2023-09-05 RX ORDER — HYDROMORPHONE HYDROCHLORIDE 1 MG/ML
0.4 INJECTION, SOLUTION INTRAMUSCULAR; INTRAVENOUS; SUBCUTANEOUS EVERY 5 MIN PRN
Status: DISCONTINUED | OUTPATIENT
Start: 2023-09-05 | End: 2023-09-06 | Stop reason: HOSPADM

## 2023-09-05 RX ORDER — HYDROMORPHONE HYDROCHLORIDE 1 MG/ML
0.2 INJECTION, SOLUTION INTRAMUSCULAR; INTRAVENOUS; SUBCUTANEOUS EVERY 5 MIN PRN
Status: DISCONTINUED | OUTPATIENT
Start: 2023-09-05 | End: 2023-09-06 | Stop reason: HOSPADM

## 2023-09-05 RX ORDER — SODIUM CHLORIDE, SODIUM LACTATE, POTASSIUM CHLORIDE, CALCIUM CHLORIDE 600; 310; 30; 20 MG/100ML; MG/100ML; MG/100ML; MG/100ML
INJECTION, SOLUTION INTRAVENOUS CONTINUOUS
Status: DISCONTINUED | OUTPATIENT
Start: 2023-09-05 | End: 2023-09-06 | Stop reason: HOSPADM

## 2023-09-05 RX ORDER — BALANCED SALT SOLUTION 6.4; .75; .48; .3; 3.9; 1.7 MG/ML; MG/ML; MG/ML; MG/ML; MG/ML; MG/ML
SOLUTION OPHTHALMIC PRN
Status: DISCONTINUED | OUTPATIENT
Start: 2023-09-05 | End: 2023-09-05 | Stop reason: HOSPADM

## 2023-09-05 RX ORDER — DEXAMETHASONE SODIUM PHOSPHATE 4 MG/ML
INJECTION, SOLUTION INTRA-ARTICULAR; INTRALESIONAL; INTRAMUSCULAR; INTRAVENOUS; SOFT TISSUE PRN
Status: DISCONTINUED | OUTPATIENT
Start: 2023-09-05 | End: 2023-09-05 | Stop reason: HOSPADM

## 2023-09-05 RX ORDER — FENTANYL CITRATE 50 UG/ML
25 INJECTION, SOLUTION INTRAMUSCULAR; INTRAVENOUS EVERY 5 MIN PRN
Status: DISCONTINUED | OUTPATIENT
Start: 2023-09-05 | End: 2023-09-06 | Stop reason: HOSPADM

## 2023-09-05 RX ORDER — DEXAMETHASONE SODIUM PHOSPHATE 4 MG/ML
INJECTION, SOLUTION INTRA-ARTICULAR; INTRALESIONAL; INTRAMUSCULAR; INTRAVENOUS; SOFT TISSUE PRN
Status: DISCONTINUED | OUTPATIENT
Start: 2023-09-05 | End: 2023-09-05

## 2023-09-05 RX ORDER — ONDANSETRON 4 MG/1
4 TABLET, ORALLY DISINTEGRATING ORAL EVERY 30 MIN PRN
Status: DISCONTINUED | OUTPATIENT
Start: 2023-09-05 | End: 2023-09-06 | Stop reason: HOSPADM

## 2023-09-05 RX ORDER — FENTANYL CITRATE 50 UG/ML
50 INJECTION, SOLUTION INTRAMUSCULAR; INTRAVENOUS EVERY 5 MIN PRN
Status: DISCONTINUED | OUTPATIENT
Start: 2023-09-05 | End: 2023-09-06 | Stop reason: HOSPADM

## 2023-09-05 RX ORDER — ACETAMINOPHEN 325 MG/1
975 TABLET ORAL ONCE
Status: COMPLETED | OUTPATIENT
Start: 2023-09-05 | End: 2023-09-05

## 2023-09-05 RX ORDER — TETRACAINE HYDROCHLORIDE 5 MG/ML
SOLUTION OPHTHALMIC PRN
Status: DISCONTINUED | OUTPATIENT
Start: 2023-09-05 | End: 2023-09-05 | Stop reason: HOSPADM

## 2023-09-05 RX ORDER — CYCLOPENTOLAT/TROPIC/PHENYLEPH 1%-1%-2.5%
1 DROPS (EA) OPHTHALMIC (EYE)
Status: COMPLETED | OUTPATIENT
Start: 2023-09-05 | End: 2023-09-05

## 2023-09-05 RX ORDER — OXYCODONE HYDROCHLORIDE 5 MG/1
10 TABLET ORAL
Status: DISCONTINUED | OUTPATIENT
Start: 2023-09-05 | End: 2023-09-06 | Stop reason: HOSPADM

## 2023-09-05 RX ORDER — ONDANSETRON 2 MG/ML
4 INJECTION INTRAMUSCULAR; INTRAVENOUS EVERY 30 MIN PRN
Status: DISCONTINUED | OUTPATIENT
Start: 2023-09-05 | End: 2023-09-06 | Stop reason: HOSPADM

## 2023-09-05 RX ORDER — PROPOFOL 10 MG/ML
INJECTION, EMULSION INTRAVENOUS PRN
Status: DISCONTINUED | OUTPATIENT
Start: 2023-09-05 | End: 2023-09-05

## 2023-09-05 RX ORDER — ONDANSETRON 2 MG/ML
INJECTION INTRAMUSCULAR; INTRAVENOUS PRN
Status: DISCONTINUED | OUTPATIENT
Start: 2023-09-05 | End: 2023-09-05

## 2023-09-05 RX ORDER — OXYCODONE HYDROCHLORIDE 5 MG/1
5 TABLET ORAL
Status: DISCONTINUED | OUTPATIENT
Start: 2023-09-05 | End: 2023-09-06 | Stop reason: HOSPADM

## 2023-09-05 RX ORDER — PREDNISOLONE ACETATE 10 MG/ML
1 SUSPENSION/ DROPS OPHTHALMIC 4 TIMES DAILY
Qty: 5 ML | Refills: 1 | Status: SHIPPED | OUTPATIENT
Start: 2023-09-05 | End: 2023-09-28

## 2023-09-05 RX ORDER — OFLOXACIN 3 MG/ML
1 SOLUTION/ DROPS OPHTHALMIC 4 TIMES DAILY
Qty: 5 ML | Refills: 0 | Status: SHIPPED | OUTPATIENT
Start: 2023-09-05 | End: 2024-02-07

## 2023-09-05 RX ORDER — PROPOFOL 10 MG/ML
INJECTION, EMULSION INTRAVENOUS CONTINUOUS PRN
Status: DISCONTINUED | OUTPATIENT
Start: 2023-09-05 | End: 2023-09-05

## 2023-09-05 RX ORDER — PROPARACAINE HYDROCHLORIDE 5 MG/ML
1 SOLUTION/ DROPS OPHTHALMIC ONCE
Status: COMPLETED | OUTPATIENT
Start: 2023-09-05 | End: 2023-09-05

## 2023-09-05 RX ORDER — LIDOCAINE HYDROCHLORIDE 20 MG/ML
INJECTION, SOLUTION INFILTRATION; PERINEURAL PRN
Status: DISCONTINUED | OUTPATIENT
Start: 2023-09-05 | End: 2023-09-05

## 2023-09-05 RX ORDER — LIDOCAINE 40 MG/G
CREAM TOPICAL
Status: DISCONTINUED | OUTPATIENT
Start: 2023-09-05 | End: 2023-09-06 | Stop reason: HOSPADM

## 2023-09-05 RX ADMIN — PROPOFOL 200 MCG/KG/MIN: 10 INJECTION, EMULSION INTRAVENOUS at 10:20

## 2023-09-05 RX ADMIN — PROPOFOL 10 MG: 10 INJECTION, EMULSION INTRAVENOUS at 10:52

## 2023-09-05 RX ADMIN — Medication 1 DROP: at 08:46

## 2023-09-05 RX ADMIN — ONDANSETRON 4 MG: 2 INJECTION INTRAMUSCULAR; INTRAVENOUS at 10:20

## 2023-09-05 RX ADMIN — ACETAMINOPHEN 975 MG: 325 TABLET ORAL at 08:47

## 2023-09-05 RX ADMIN — PROPOFOL 10 MG: 10 INJECTION, EMULSION INTRAVENOUS at 10:54

## 2023-09-05 RX ADMIN — Medication 1 DROP: at 09:04

## 2023-09-05 RX ADMIN — PROPOFOL 100 MG: 10 INJECTION, EMULSION INTRAVENOUS at 10:20

## 2023-09-05 RX ADMIN — SODIUM CHLORIDE, SODIUM LACTATE, POTASSIUM CHLORIDE, CALCIUM CHLORIDE: 600; 310; 30; 20 INJECTION, SOLUTION INTRAVENOUS at 10:09

## 2023-09-05 RX ADMIN — LIDOCAINE HYDROCHLORIDE 60 MG: 20 INJECTION, SOLUTION INFILTRATION; PERINEURAL at 10:20

## 2023-09-05 RX ADMIN — DEXAMETHASONE SODIUM PHOSPHATE 4 MG: 4 INJECTION, SOLUTION INTRA-ARTICULAR; INTRALESIONAL; INTRAMUSCULAR; INTRAVENOUS; SOFT TISSUE at 10:33

## 2023-09-05 RX ADMIN — Medication 1 DROP: at 08:57

## 2023-09-05 RX ADMIN — PROPARACAINE HYDROCHLORIDE 1 DROP: 5 SOLUTION/ DROPS OPHTHALMIC at 08:46

## 2023-09-05 NOTE — DISCHARGE INSTRUCTIONS
Dr. Angelica Griffiths  [facility]  [phone]  Post Operative Cataract Instructions    If you have a gauze eye patch on, please do not remove it until it is removed by your physician at your first post-operative visit.  You will start your eye drops the next day.    Wear the clear eye shield when sleeping for protection for 14 days.    Do not rub the operated eye.    Light sensitivity may be noticed. Sunglasses may be worn for comfort.    Some discomfort and irritation may be noticed. Acetaminophen (Tylenol) or Ibuprofen (Advil) may be taken for discomfort. If pain persists please call Dr. Griffiths's office.    Keep the operated eye dry. You may wash your hair, bathe or shower, but keep the operated eye closed while doing so.     If you take glaucoma medications, bring them with you to the clinic on your first post operative visit.    Bring your prescribed eye drops with you to your scheduled post-operative appointment.    Use medication exactly as prescribed by your doctor. You may restart your regular home medications.     Call Dr. Griffiths's office at [phone] if any of the following should occur:    Any sudden vision changes, including decreased vision  Nausea or severe headache  Increase in pain not controlled  Signs of infection (pus, increasing redness or tenderness)  Severe sensitivity to light      [facility]  Home Care Following Anesthesia  For 24 hours after surgery:  Get plenty of rest.  A responsible adult must stay with you for at least 24 hours after you leave the surgery center.  Do not drive or use heavy equipment.  If you have weakness or tingling, don't drive or use heavy equipment until this feeling goes away.   Do not drink alcohol.   Avoid strenuous or risky activities.  Ask for help when climbing stairs.  You may feel lightheaded.  IF so, sit for a few minutes before standing.  Have someone help you get up.   If you have nausea (feel  sick to your stomach): Drink only clear liquids such as apple juice, ginger ale, broth or 7-Up.  Rest may also help.  Be sure to drink enough fluids.  Move to a regular diet as you feel able.   You may have a slight fever.  Call the doctor if your fever is over 100 F (37.7 C) (taken under the tongue) or lasts longer than 24 hours.  You may have a dry mouth, a sore throat, muscle aches or trouble sleeping. These should go away after 24 hours.  Do not make important or legal decisions.   It is recommended to avoid smoking.               Tips for taking pain medications  To get the best pain relief possible, remember these points:  Take pain medications as directed, before pain becomes severe.  Pain medication can upset your stomach: taking it with food may help.  Constipation is a common side effect of pain medication. Drink plenty of  fluids.  Eat foods high in fiber. Take a stool softener if recommended by your doctor or pharmacist.  Do not drink alcohol, drive or operate machinery while taking pain medications.  Ask about other ways to control pain, such as with heat, ice or relaxation.    Tylenol/Acetaminophen Consumption    If you feel your pain relief is insufficient, you may take Tylenol/Acetaminophen in addition to your narcotic pain medication.   Be careful not to exceed 4,000 mg of Tylenol/Acetaminophen in a 24 hour period from all sources.  If you are taking extra strength Tylenol/acetaminophen (500 mg), the maximum dose is 8 tablets in 24 hours.  If you are taking regular strength acetaminophen (325 mg), the maximum dose is 12 tablets in 24 hours.    Call a doctor for any of the following:  Signs of infection (fever, growing tenderness at the surgery site, a large amount of drainage or bleeding, severe pain, foul-smelling drainage, redness, swelling).  It has been over 8 to 10 hours since surgery and you are still not able to urinate (pass water).  Headache for over 24 hours.  Numbness, tingling or  weakness the day after surgery (if you had spinal anesthesia).  Signs of Covid-19 infection (temperature over 100 degrees, shortness of breath, cough, loss of taste/smell, generalized body aches, persistent headache, chills, sore throat, nausea/vomiting/diarrhea)  Your doctor is:       Dr. Angelica Griffiths, Ophthalmology: [phone]               Or dial [phone] and ask for the resident on call for:  Ophthalmology  For emergency care, call the:  [facility]:  [phone] (TTY for hearing impaired: [phone])

## 2023-09-05 NOTE — ANESTHESIA CARE TRANSFER NOTE
Patient: Richar Ro    Procedure: Procedure(s):  Right eye Vitrectomy parsplana with 25 gauge system, explantation of sulcus intraocular lens, implantation of anterior chamber intraocular lens       Diagnosis: Dislocation of intraocular lens, initial encounter [T85.22XA]  Diagnosis Additional Information: No value filed.    Anesthesia Type:   MAC     Note:    Oropharynx: oropharynx clear of all foreign objects and spontaneously breathing  Level of Consciousness: awake  Oxygen Supplementation: room air    Independent Airway: airway patency satisfactory and stable  Dentition: dentition unchanged  Vital Signs Stable: post-procedure vital signs reviewed and stable  Report to RN Given: handoff report given  Patient transferred to: Phase II  Comments: Vital signs per nursing documentation.       Handoff Report: Identifed the Patient, Identified the Reponsible Provider, Reviewed the pertinent medical history, Discussed the surgical course, Reviewed Intra-OP anesthesia mangement and issues during anesthesia, Set expectations for post-procedure period and Allowed opportunity for questions and acknowledgement of understanding      Vitals:  Vitals Value Taken Time   BP     Temp 36  C (96.8  F) 09/05/23 1209   Pulse 54 09/05/23 1209   Resp 16 09/05/23 1209   SpO2 99 % 09/05/23 1209       Electronically Signed By: SANCHO Elmore CRNA  September 5, 2023  12:13 PM

## 2023-09-05 NOTE — ANESTHESIA PREPROCEDURE EVALUATION
Anesthesia Pre-Procedure Evaluation    Patient: Richar Ro   MRN: [id number] : 1940        Procedure : Procedure(s):  right eye repositioning intraocular lens implant with iris fixation versus anterior chamber intraocular lens versus Yamane  possible Vitrectomy parsplana with 25 gauge system, endolaser          Past Medical History:   Diagnosis Date    Diabetes (H)     Glaucoma (increased eye pressure)     Hypercholesterolemia     Hypertension       Past Surgical History:   Procedure Laterality Date    CATARACT IOL, RT/LT Bilateral     HERNIA REPAIR      REPAIR PTOSIS Right     x4      Allergies   Allergen Reactions    Brimonidine      Other reaction(s): *Unknown    Brinzolamide      Other reaction(s): *Unknown    Dorzolamide Hcl-Timolol Mal      Other reaction(s): *Unknown    Polyvinyl Alcohol Other (See Comments)     Eyes matter shut from preservative in drops      Social History     Tobacco Use    Smoking status: Former     Types: Cigarettes    Smokeless tobacco: Never   Substance Use Topics    Alcohol use: Not on file      Wt Readings from Last 1 Encounters:   23 83.9 kg (185 lb)        Anesthesia Evaluation   Pt has had prior anesthetic. Type: General.    No history of anesthetic complications       ROS/MED HX  ENT/Pulmonary:       Neurologic:       Cardiovascular:     (+) Dyslipidemia hypertension- -   -  - -                                      METS/Exercise Tolerance:     Hematologic:  - neg hematologic  ROS     Musculoskeletal:  - neg musculoskeletal ROS     GI/Hepatic:  - neg GI/hepatic ROS     Renal/Genitourinary:  - neg Renal ROS     Endo:  - neg endo ROS  (-) Type I DM   Psychiatric/Substance Use:  - neg psychiatric ROS     Infectious Disease:  - neg infectious disease ROS     Malignancy:  - neg malignancy ROS     Other:  - neg other ROS          Physical Exam    Airway        Mallampati: II   TM distance: > 3 FB   Neck ROM: full   Mouth opening: > 3 cm    Respiratory Devices and  Support         Dental       (+) Modest Abnormalities - crowns, retainers, 1 or 2 missing teeth      Cardiovascular          Rhythm and rate: regular and normal     Pulmonary           breath sounds clear to auscultation           OUTSIDE LABS:  CBC: No results found for: WBC, HGB, HCT, PLT  BMP: No results found for: NA, POTASSIUM, CHLORIDE, CO2, BUN, CR, GLC  COAGS: No results found for: PTT, INR, FIBR  POC: No results found for: BGM, HCG, HCGS  HEPATIC: No results found for: ALBUMIN, PROTTOTAL, ALT, AST, GGT, ALKPHOS, BILITOTAL, BILIDIRECT, PARMINDER  OTHER: No results found for: PH, LACT, A1C, BRIAN, PHOS, MAG, LIPASE, AMYLASE, TSH, T4, T3, CRP, SED    Anesthesia Plan    ASA Status:  2    NPO Status:  NPO Appropriate    Anesthesia Type: General.     - Airway: LMA   Induction: Intravenous.           Consents    Anesthesia Plan(s) and associated risks, benefits, and realistic alternatives discussed. Questions answered and patient/representative(s) expressed understanding.     - Discussed: Risks, Benefits and Alternatives for BOTH SEDATION and the PROCEDURE were discussed     - Discussed with:  Patient            Postoperative Care            Comments:                Edwin Cooney MD

## 2023-09-05 NOTE — OP NOTE
SURGEON: Angelica Griffiths M.D.  ASSISTANT SURGEON:Gilberto Snyder MD, MPH  PREOPERATIVE DIAGNOSES:   1. Subluxation of PCIOL, RIGHT eye  POSTOPERATIVE DIAGNOSES:   1. Same  PROCEDURES:   1. 25 gauge pars plana vitrectomy, IOL removal, pars plana capsulectomy. Remaining of cataract removal, ACIOL placement right eye   ANESTHESIA: MAC and Retrobulbar block.   COMPLICATIONS: None.   ESTIMATED BLOOD LOSS: Scant.   INDICATIONS: Mr. Ro is here for the above procedures.  DESCRIPTION OF PROCEDURE: The patient was taken to the operating room where anesthesia was administered by the Anesthesia Department. A peribulbar block consisting of a 1:1 mixture of 2%lidocaine and 0.75% marcaine with epinephrine and wydase, was administered to the operative eye.    The patient's operative eye was prepped and draped in the usual sterile surgical fashion for ophthalmic surgery, including instillation of 1 drop of 5% povidone iodine. A sterile drape was placed over the face and body and a lid speculum was inserted. The microscope was brought into the operative field.    A 25-gauge trocar was placed inferotemporally 3.5 mm posterior to the limbus. the infusion cannula was connected and its intravitreal location was verified by direct visualization. Another 25-gauge trocar was  placed superotemporally and superonasally. A vitrectomy was performed with the 25g vitrector. Upon entering the eye it was noted Peripapillary atrophy, macula atrophic changes and peripheral Retinal pigment epithelium atrophic changes. There was a dislocated intraocular lens with remaining of lens material in the capsular bag    A paracentesis blade was used to create a corneal wound nasally. Healon was placed into the AC. The intraocular lens was dislocated into the Anterior chamber. On the temporal side, the keratome was utilized to create a limbal cornea wound to remove the IOL with the help of IOL forceps. Utilizing a glide, an ACIOL was placed into the anterior  chamber. The corneal wound was then closed with five 10-0 nylon sutures.    A 20 gauge MVR blade was inserted parallel to the limbus to enlarge the superotemporal wound.  The fragmotome was placed superonasally and the anterior capsular fragments and remaining of lens were removed from the eye.      Aspiration was used through the superonasal sclerotomy to remove any additional healon from the sulcus.  The superonasal sclerotomy was closed with a X-stitch 7-0 vicryl suture.  The remaining sclerotomies were closed with 6-0 plain gut sutures.  All wounds were found to be water tight.  The superotemporal peritomy was closed with a single 6-0 plain gut suture.      Subconjunctival injections of ancef and dexamethasone were placed.  The lid speculum was removed and the eye was cleaned and dried with gauze.  Maxitrol ointment was placed into the eye. The eye was covered with 2 eye pads, and an eye shield.      The patient was recovered from anesthesia and taken to the recovery room having tolerated the procedure well.     The surgery was assisted by Dr. Gilberto Snyder, because no qualified resident was available on the day of the surgery. Due to the delicate and complex nature of this surgery, Dr. Snyder was required. Dr. Snyder assisted with lens capsule removal. I was present for the entire surgery.      Angelica Griffiths MD    Implant Name Type Inv. Item Serial No.  Lot No. LRB No. Used Action   Intraocular Lens Implant      Right 1 Explanted   EYE IMP IOL RENE ACL [id number] 05.5   [id number]   Right 1 Implanted

## 2023-09-06 ENCOUNTER — OFFICE VISIT (OUTPATIENT)
Dept: OPHTHALMOLOGY | Facility: CLINIC | Age: 83
End: 2023-09-06
Attending: OPHTHALMOLOGY
Payer: COMMERCIAL

## 2023-09-06 DIAGNOSIS — Z48.810 AFTERCARE FOLLOWING SURGERY OF A SENSORY ORGAN: Primary | ICD-10-CM

## 2023-09-06 PROCEDURE — G0463 HOSPITAL OUTPT CLINIC VISIT: HCPCS | Performed by: OPHTHALMOLOGY

## 2023-09-06 PROCEDURE — 99024 POSTOP FOLLOW-UP VISIT: CPT | Performed by: OPHTHALMOLOGY

## 2023-09-06 ASSESSMENT — VISUAL ACUITY
OS_CC: 20/25
OS_CC+: -1
METHOD: SNELLEN - LINEAR
OD_SC: 2' 200E

## 2023-09-06 ASSESSMENT — TONOMETRY
IOP_METHOD: TONOPEN
OS_IOP_MMHG: 13
OD_IOP_MMHG: 5

## 2023-09-06 ASSESSMENT — SLIT LAMP EXAM - LIDS
COMMENTS: NORMAL
COMMENTS: MGD

## 2023-09-06 NOTE — ANESTHESIA POSTPROCEDURE EVALUATION
Patient: Richar Ro    Procedure: Procedure(s):  Right eye Vitrectomy parsplana with 25 gauge system, explantation of sulcus intraocular lens, implantation of anterior chamber intraocular lens       Anesthesia Type:  MAC    Note:  Disposition: Outpatient   Postop Pain Control: Uneventful            Sign Out: Well controlled pain   PONV: No   Neuro/Psych: Uneventful            Sign Out: Acceptable/Baseline neuro status   Airway/Respiratory: Uneventful            Sign Out: Acceptable/Baseline resp. status   CV/Hemodynamics: Uneventful            Sign Out: Acceptable CV status; No obvious hypovolemia; No obvious fluid overload   Other NRE: NONE   DID A NON-ROUTINE EVENT OCCUR? No           Last vitals:  Vitals Value Taken Time   /53 09/05/23 1239   Temp 36.1  C (97  F) 09/05/23 1239   Pulse 54 09/05/23 1239   Resp 16 09/05/23 1239   SpO2 98 % 09/05/23 1239       Electronically Signed By: Edwin Cooney MD  September 6, 2023  1:20 PM

## 2023-09-06 NOTE — PROGRESS NOTES
"Postoperative day 1 status post posterior chamber intraocular lens (PCIOL) removal/ Anterior chamber intraocular lens  placement 25 g Pars plana vitrectomy (PPV)/ Pars plana lensectomy (removal of lens material and dense/ calcified capsule)    Slept well  Retina attached  Doing well  Anterior chamber intraocular lens  in good position      Plan    Predforte  (pink top) six times a day  (shake the bottle before)  Ofloxacin (tan top) four times a day    Maxitrol oint at bedtime   Put the eyedrops 5 minutes a part  Eye shield or glasses at all times x 3 weeks  Sleep with the shield  No heavy lifting   Follow-up in one week  Retina detachment and endophthalmitis precautions were discussed with the patient (increased blurry vision, drainage, new flashes, floaters or a curtain in the visual field) and was asked to return if any of the those occur  DO not rub your eyes    What to watch out for:  If you experience any of the following \"RSVP Symptoms\", you should call immediately:  Worsening Redness  Worsening Sensitivity to light  Worsening Vision, including new flashing lights or floaters  Worsening Pain, including nausea/vomiting      Follow up in one week  ~~~~~~~~~~~~~~~~~~~~~~~~~~~~~~~~~~   Complete documentation of historical and exam elements from today's encounter can be found in the full encounter summary report (not reduplicated in this progress note).  I personally obtained the chief complaint(s) and history of present illness.  I confirmed and edited as necessary the review of systems, past medical/surgical history, family history, social history, and examination findings as documented by others; and I examined the patient myself.  I personally reviewed the relevant tests, images, and reports as documented above.  I formulated and edited as necessary the assessment and plan and discussed the findings and management plan with the patient and family    Angelica Griffiths MD  .  Retina Service "   Department of Ophthalmology and Visual Neurosciences   [facility]  Phone: [phone]   Fax: [phone]

## 2023-09-06 NOTE — PATIENT INSTRUCTIONS
"  Predforte  (pink top) six times a day  (shake the bottle before)  Ofloxacin (tan top) four times a day  Maxitrol oint at bedtime    Put the eyedrops 5 minutes a part  Eye shield or glasses at all times x 3 weeks  Sleep with the shield  No heavy lifting   Follow-up in one week  Retina detachment and endophthalmitis precautions were discussed with the patient (increased blurry vision, drainage, new flashes, floaters or a curtain in the visual field) and was asked to return if any of the those occur  DO not rub your eyes    What to watch out for:  If you experience any of the following \"RSVP Symptoms\", you should call immediately:  Worsening Redness  Worsening Sensitivity to light  Worsening Vision, including new flashing lights or floaters  Worsening Pain, including nausea/vomiting  "

## 2023-09-06 NOTE — NURSING NOTE
Chief Complaints and History of Present Illnesses   Patient presents with    Post Op (Ophthalmology) Right Eye     Chief Complaint(s) and History of Present Illness(es)       Post Op (Ophthalmology) Right Eye              Laterality: right eye    Associated symptoms: Negative for dryness, eye pain, tearing and flashes    Pain scale: 0/10              Comments    Richar is here one day post RE lens repositioning. He says he slept well last night and has no discomfort today.     Chaka Baker COT 7:32 AM September 6, 2023

## 2023-09-08 DIAGNOSIS — H40.1133 PRIMARY OPEN ANGLE GLAUCOMA (POAG) OF BOTH EYES, SEVERE STAGE: ICD-10-CM

## 2023-09-13 ENCOUNTER — TELEPHONE (OUTPATIENT)
Dept: OPHTHALMOLOGY | Facility: CLINIC | Age: 83
End: 2023-09-13

## 2023-09-13 ENCOUNTER — OFFICE VISIT (OUTPATIENT)
Dept: OPHTHALMOLOGY | Facility: CLINIC | Age: 83
End: 2023-09-13
Attending: OPHTHALMOLOGY
Payer: COMMERCIAL

## 2023-09-13 DIAGNOSIS — Z48.810 AFTERCARE FOLLOWING SURGERY OF A SENSORY ORGAN: Primary | ICD-10-CM

## 2023-09-13 PROCEDURE — 99024 POSTOP FOLLOW-UP VISIT: CPT | Mod: GC | Performed by: OPHTHALMOLOGY

## 2023-09-13 PROCEDURE — G0463 HOSPITAL OUTPT CLINIC VISIT: HCPCS | Performed by: OPHTHALMOLOGY

## 2023-09-13 RX ORDER — LATANOPROSTENE BUNOD 0.24 MG/ML
1 SOLUTION/ DROPS OPHTHALMIC AT BEDTIME
Qty: 5 ML | Refills: 5 | Status: SHIPPED | OUTPATIENT
Start: 2023-09-13 | End: 2023-10-05

## 2023-09-13 ASSESSMENT — VISUAL ACUITY
METHOD: SNELLEN - LINEAR
OD_CC: 5/200 E
OS_CC: 20/30
CORRECTION_TYPE: GLASSES
OS_PH_CC+: -2
OD_PH_CC: 20/400
OS_PH_CC: 20/25

## 2023-09-13 ASSESSMENT — TONOMETRY
OS_IOP_MMHG: 16
OD_IOP_MMHG: --
IOP_METHOD: ICARE
IOP_METHOD: TONOPEN
OD_IOP_MMHG: 06
OS_IOP_MMHG: 13

## 2023-09-13 ASSESSMENT — REFRACTION_WEARINGRX
OD_ADD: +2.50
OD_SPHERE: PLANO
OS_SPHERE: PLANO
SPECS_TYPE: BIFOCAL
OS_AXIS: 150
OS_CYLINDER: +1.75
OS_ADD: +2.50
OD_CYLINDER: SPHERE

## 2023-09-13 ASSESSMENT — CONF VISUAL FIELD
OS_SUPERIOR_NASAL_RESTRICTION: 0
OS_NORMAL: 1
OD_SUPERIOR_NASAL_RESTRICTION: 1
OS_INFERIOR_NASAL_RESTRICTION: 0
OD_INFERIOR_TEMPORAL_RESTRICTION: 1
OS_INFERIOR_TEMPORAL_RESTRICTION: 0
OD_INFERIOR_NASAL_RESTRICTION: 3
OD_SUPERIOR_TEMPORAL_RESTRICTION: 1
METHOD: COUNTING FINGERS
OS_SUPERIOR_TEMPORAL_RESTRICTION: 0

## 2023-09-13 ASSESSMENT — SLIT LAMP EXAM - LIDS: COMMENTS: NORMAL

## 2023-09-13 NOTE — NURSING NOTE
Chief Complaints and History of Present Illnesses   Patient presents with    Post Op (Ophthalmology) Right Eye     POW#1 s/p  (PCIOL) removal/ Anterior chamber intraocular lens  placement 25 g (PPV)/ Pars plana lensectomy (removal of lens material and dense/ calcified capsule) Right Eye 09/05/2023     Chief Complaint(s) and History of Present Illness(es)       Post Op (Ophthalmology) Right Eye              Comments: POW#1 s/p  (PCIOL) removal/ Anterior chamber intraocular lens  placement 25 g (PPV)/ Pars plana lensectomy (removal of lens material and dense/ calcified capsule) Right Eye 09/05/2023              Comments    Pt states vision has improved since last visit. No eye pain today. No new flashes or floaters.  No new redness or dryness.    ADRIEN Nuñez September 13, 2023 7:38 AM

## 2023-09-13 NOTE — PATIENT INSTRUCTIONS
"  Predforte  (pink top) 3x daily 1 week, 2x daily one week, daily one week  Continue  Ofloxacin (tan top) three times a day    STOP Vyzulta right eye  Continue Vyzulta and Timolol OS    Put the eyedrops 5 minutes a part  Eye shield or glasses at all times x 3 weeks  Sleep with the shield for one more week  No heavy lifting for one more week    Retina detachment and endophthalmitis precautions were discussed with the patient (increased blurry vision, drainage, new flashes, floaters or a curtain in the visual field) and was asked to return if any of the those occur  DO not rub your eyes    RTC: 2 weeks; dilate each eye; OCT OU    What to watch out for:  If you experience any of the following \"RSVP Symptoms\", you should call immediately:  Worsening Redness  Worsening Sensitivity to light  Worsening Vision, including new flashing lights or floaters  Worsening Pain, including nausea/vomiting  "

## 2023-09-13 NOTE — PROGRESS NOTES
"Postoperative week 1 status post posterior chamber intraocular lens (PCIOL) removal/ Anterior chamber intraocular lens  placement 25 g Pars plana vitrectomy (PPV)/ Pars plana lensectomy (removal of lens material and dense/ calcified capsule)  RIGHT EYE    Slept well  Retina attached  Doing well  Anterior chamber intraocular lens  in good position      Plan    Predforte  (pink top) 3x daily 1 week, 2x daily one week, daily one week  Continue  Ofloxacin (tan top) three times a day    STOP Vyzulta right eye  Continue Vyzulta and Timolol OS    Put the eyedrops 5 minutes a part  Eye shield or glasses at all times x 3 weeks  Sleep with the shield for one more week  No heavy lifting for one more week    Retina detachment and endophthalmitis precautions were discussed with the patient (increased blurry vision, drainage, new flashes, floaters or a curtain in the visual field) and was asked to return if any of the those occur  DO not rub your eyes    RTC: 2 weeks, POM1; dilate each eye; OCT OU    What to watch out for:  If you experience any of the following \"RSVP Symptoms\", you should call immediately:  Worsening Redness  Worsening Sensitivity to light  Worsening Vision, including new flashing lights or floaters  Worsening Pain, including nausea/vomiting    Gilberto Snyder MD MPH  Vitreoretinal Fellow PGY-6  [facility]     ~~~~~~~~~~~~~~~~~~~~~~~~~~~~~~~~~~   Complete documentation of historical and exam elements from today's encounter can be found in the full encounter summary report (not reduplicated in this progress note).  I personally obtained the chief complaint(s) and history of present illness.  I confirmed and edited as necessary the review of systems, past medical/surgical history, family history, social history, and examination findings as documented by others; and I examined the patient myself.  I personally reviewed the relevant tests, images, and reports as documented above.  I formulated and edited " as necessary the assessment and plan and discussed the findings and management plan with the patient and family    Angelica Griffiths MD  .  Retina Service   Department of Ophthalmology and Visual Neurosciences   [facility]  Phone: [phone]   Fax: [phone]

## 2023-09-13 NOTE — TELEPHONE ENCOUNTER
Pt seen 9- by Dr. Griffiths and pt to use Vyvulta in right     Per note today: Predforte  (pink top) 3x daily 1 week, 2x daily one week, daily one week  Continue  Ofloxacin (tan top) three times a day    STOP Vyzulta right eye  Continue Vyzulta and Timolol left eye    --    Spoke to pt and reviewed Rx for vyvulta for left eye only use to pt preferred [facility] pharmay in [place]    Philip Higgins RN 1:15 PM 09/13/23

## 2023-09-13 NOTE — TELEPHONE ENCOUNTER
" VYZULTA 0.024 % SOLN ophthalmic solution     Last Written Prescription Date:   10/16/2022  Last Fill Quantity: 5,   # refills: 11  Last Office Visit :  9/13/2023  Future Office visit:  9/28/2023      Angelica Griffiths MD  Ophthalmology     Plan     Predforte  (pink top) 3x daily 1 week, 2x daily one week, daily one week  Continue  Ofloxacin (tan top) three times a day    STOP Vyzulta right eye  Continue Vyzulta and Timolol OS     Put the eyedrops 5 minutes a part  Eye shield or glasses at all times x 3 weeks  Sleep with the shield for one more week  No heavy lifting for one more week     Retina detachment and endophthalmitis precautions were discussed with the patient (increased blurry vision, drainage, new flashes, floaters or a curtain in the visual field) and was asked to return if any of the those occur  DO not rub your eyes     RTC: 2 weeks, POM1; dilate each eye; OCT OU     What to watch out for:  If you experience any of the following \"RSVP Symptoms\", you should call immediately:  Worsening Redness  Worsening Sensitivity to light  Worsening Vision, including new flashing lights or floaters  Worsening Pain, including nausea/vomiting     Gilberto Snyder MD MPH  Vitreoretinal Fellow PGY-6  [facility]     Routing refill request to provider for review/approval because:  In last visit note 9/13/2023.  It mentions to stop this eye drop.  Refer to clinic for review and confirming med is to be stopped.      Ramona Koo RN  Central Triage Red Flags/Med Refills    "

## 2023-09-15 ENCOUNTER — TELEPHONE (OUTPATIENT)
Dept: OPHTHALMOLOGY | Facility: CLINIC | Age: 83
End: 2023-09-15
Payer: COMMERCIAL

## 2023-09-15 NOTE — TELEPHONE ENCOUNTER
[facility] Call [facility]    Phone Message    May a detailed message be left on voicemail: yes     Reason for Call: Medication Question or concern regarding medication   Prescription Clarification  Name of Medication: Vyzluta  Prescribing Provider:    Pharmacy: [facility]pharm[facility] #[id number] - [place], [place] - [address]    What on the order needs clarification? Parisa from pt's insurance w/ [employer] is wondering if pt can be given sample drops to keep till Monday. States pt cannot get refill on the drops till Monday 9/18 due to insurance not approving it any sooner. States pt is completely out and they cannot approve prior authorization till Monday. Please reach out to pt if that is possible or with an update max alternatives      Action Taken: Message routed to:  [facility]    Travel Screening: Not Applicable

## 2023-09-15 NOTE — TELEPHONE ENCOUNTER
No vyvulta samples    Spoke to pt and reviewed able to  on Monday per message received    Pt states may have another drop or so in bottle    Reviewed to reach out to pharmacy on Monday and should go thru insurance.    If having any problems obtaining on Monday to reach out to clinic for additional assistance.    Philip Higgins RN 3:12 PM 09/15/23

## 2023-09-16 ENCOUNTER — TELEPHONE (OUTPATIENT)
Dept: OPHTHALMOLOGY | Facility: CLINIC | Age: 83
End: 2023-09-16

## 2023-09-19 NOTE — TELEPHONE ENCOUNTER
Prior Authorization Not Needed per Insurance    Medication: VYZULTA 0.024 % OP SOLN  Insurance Company: -[employer] - Phone [phone] Fax [phone]  Expected CoPay:      Pharmacy Filling the Rx: [id number] - [place] - [address]    Per telephone encounter 9/15/23, insurance states medication can be filled on 9/18/23 and patient is aware. PA is in place per message from insurance below.

## 2023-09-20 DIAGNOSIS — H35.373 EPIRETINAL MEMBRANE (ERM) OF BOTH EYES: Primary | ICD-10-CM

## 2023-09-28 ENCOUNTER — OFFICE VISIT (OUTPATIENT)
Dept: OPHTHALMOLOGY | Facility: CLINIC | Age: 83
End: 2023-09-28
Attending: OPHTHALMOLOGY
Payer: COMMERCIAL

## 2023-09-28 DIAGNOSIS — Z48.810 AFTERCARE FOLLOWING SURGERY OF A SENSE ORGAN: ICD-10-CM

## 2023-09-28 DIAGNOSIS — H35.351 CYSTOID MACULAR EDEMA OF RIGHT EYE: ICD-10-CM

## 2023-09-28 DIAGNOSIS — Z48.810 AFTERCARE FOLLOWING SURGERY OF A SENSORY ORGAN: Primary | ICD-10-CM

## 2023-09-28 PROCEDURE — G0463 HOSPITAL OUTPT CLINIC VISIT: HCPCS | Performed by: OPHTHALMOLOGY

## 2023-09-28 PROCEDURE — 99024 POSTOP FOLLOW-UP VISIT: CPT | Performed by: OPHTHALMOLOGY

## 2023-09-28 PROCEDURE — 92134 CPTRZ OPH DX IMG PST SGM RTA: CPT | Performed by: OPHTHALMOLOGY

## 2023-09-28 RX ORDER — PREDNISOLONE ACETATE 10 MG/ML
1 SUSPENSION/ DROPS OPHTHALMIC 3 TIMES DAILY
Qty: 5 ML | Refills: 1 | Status: SHIPPED | OUTPATIENT
Start: 2023-09-28 | End: 2024-02-07

## 2023-09-28 ASSESSMENT — TONOMETRY
OD_IOP_MMHG: 12
OS_IOP_MMHG: 13
IOP_METHOD: TONOPEN

## 2023-09-28 ASSESSMENT — VISUAL ACUITY
OS_CC: 20/30
CORRECTION_TYPE: GLASSES
METHOD: SNELLEN - LINEAR
OD_CC: 3'/200E
OD_PH_CC: 20/600
OS_PH_CC+: -2
OS_PH_CC: 20/25

## 2023-09-28 ASSESSMENT — SLIT LAMP EXAM - LIDS: COMMENTS: NORMAL

## 2023-09-28 NOTE — NURSING NOTE
Chief Complaints and History of Present Illnesses   Patient presents with    Post Op (Ophthalmology) Right Eye     Right eye Vitrectomy parsplana with 25 gauge system, explantation of sulcus intraocular lens, implantation of anterior chamber intraocular lens 9/5/23     Chief Complaint(s) and History of Present Illness(es)       Post Op (Ophthalmology) Right Eye              Comments: Right eye Vitrectomy parsplana with 25 gauge system, explantation of sulcus intraocular lens, implantation of anterior chamber intraocular lens 9/5/23              Comments    Pt doing well  No flashes, floaters or eye pain    Clarisa Barahona COT 8:52 AM September 28, 2023

## 2023-09-28 NOTE — PROGRESS NOTES
CC: status post posterior chamber intraocular lens (PCIOL) removal/ Anterior chamber intraocular lens  placement 25 g Pars plana vitrectomy (PPV)/ Pars plana lensectomy (removal of lens material and dense/ calcified capsule)  RIGHT EYE 9.5.23  For dislocated intraocular lens   Reports still blurry vision  Retina attached  Doing well  Anterior chamber intraocular lens  in good position    Past Ocular Surgery  2000 CEIOL OU Dr. Yanes  2002 SLT OU  2006 trab [facility] with vitrectomy OD Dr. Burnett  2007 secondary IOL OD Dr. Burnett  2011 SLT OS Dr. Burnett  2014 bleb revision OD Dr. Burnett    Imaging:  OCT 09/28/23   OD- Subretinal fibrosis, ERM, subfoveal atrophy, lamellar hole, temporal macular schisis and outer segment atrophy, thin choroid - stable  OS-Epiretinal membrane with mild foveal traction, foveal contour present, myopic contour of fundus, no fluid, PEDs,     FAF 07/26/23   OD-Hypo-AF involving fovea with some hyper-AF along the borders; peripheral hypoautofluorescence nasally  OS-PPA; scattered hypo- and hyper-AF lesions    Slit Lamp 03/20/23  OD-Inferotemporal subluxation of 3-piece IOL    Fundus Photos 07/26/23   OU consistent with exam    Assessment/plan:    #  status post posterior chamber intraocular lens (PCIOL) removal/ Anterior chamber intraocular lens  placement 25 g Pars plana vitrectomy (PPV)/ Pars plana lensectomy (removal of lens material and dense/ calcified capsule)  RIGHT EYE 9.5.23  Subluxation of PCIOL OD  Doing well  Anterior chamber intraocular lens  in good position    # Pathologic myopia, OU   -  A/w severe peripapillary and central atrophy OD>>OS   - lattice present OD, s/p laser barricade?    # ERM, OU  Monitor at this time    # POAG  - on IOP 13/13   - on Timolol QAM OS and Vyzulta QHS OS, no drops OD  - Meds AEs/intolerance: brimonidine, brinzolamide, dorzolamide, rocklatan, rhopressa  - follows w/ Dr. Moya    # ptosis right eye   Chronic; reports 3 prior fail  "surgeries at [facility]  Plan    PLAN:   Predforte  (pink top) 3x daily 1 week, 2x daily one week, daily one week  STOP Ofloxacin (tan top)   STOP Vyzulta right eye    Continue Vyzulta and Timolol OS    Put the eyedrops 5 minutes a part  Eye shield or glasses at all times x 3 weeks  Sleep with the shield for one more week  No heavy lifting for one more week    Retina detachment and endophthalmitis precautions were discussed with the patient (increased blurry vision, drainage, new flashes, floaters or a curtain in the visual field) and was asked to return if any of the those occur  DO not rub your eyes    What to watch out for:  If you experience any of the following \"RSVP Symptoms\", you should call immediately:  Worsening Redness  Worsening Sensitivity to light  Worsening Vision, including new flashing lights or floaters  Worsening Pain, including nausea/vomiting    PLAN:  Follow up in one month with optos and possible K suture removal     ~~~~~~~~~~~~~~~~~~~~~~~~~~~~~~~~~~   Complete documentation of historical and exam elements from today's encounter can be found in the full encounter summary report (not reduplicated in this progress note).  I personally obtained the chief complaint(s) and history of present illness.  I confirmed and edited as necessary the review of systems, past medical/surgical history, family history, social history, and examination findings as documented by others; and I examined the patient myself.  I personally reviewed the relevant tests, images, and reports as documented above.  I formulated and edited as necessary the assessment and plan and discussed the findings and management plan with the patient and family    Angelica Griffiths MD  Professor of Ophthalmology  Vitreo-Retinal surgeon   Department of Ophthalmology and Visual Neurosciences   [facility]  Phone: [phone]   Fax: [phone]           "

## 2023-09-28 NOTE — PATIENT INSTRUCTIONS
"  Predforte  (pink top) 3x daily 1 week, 2x daily one week, daily one week  STOP Ofloxacin (tan top)   STOP Vyzulta right eye    Continue Vyzulta and Timolol OS    Put the eyedrops 5 minutes a part  Eye shield or glasses at all times x 3 weeks  Sleep with the shield for one more week  No heavy lifting for one more week    Retina detachment and endophthalmitis precautions were discussed with the patient (increased blurry vision, drainage, new flashes, floaters or a curtain in the visual field) and was asked to return if any of the those occur  DO not rub your eyes    What to watch out for:  If you experience any of the following \"RSVP Symptoms\", you should call immediately:  Worsening Redness  Worsening Sensitivity to light  Worsening Vision, including new flashing lights or floaters  Worsening Pain, including nausea/vomiting  "

## 2023-10-04 NOTE — PROGRESS NOTES
2000 CEIOL OU Dr. Yanes  2002 SLT OU  2006 trab [facility] with vitrectomy OD Dr. Burnett  2007 secondary IOL OD Dr. Burnett  2011 SLT OS Dr. Burnett  2014 bleb revision OD Dr. Burnett  2023: SLT left eye    Chief Complaint/Presenting Concern: Glaucoma follow up     History of Present Illness:   Richar Ro is a 83 year old patient who presents for glaucoma follow up.     Today, 10/04/2023, patient states no new symptoms. Feels stable. Wife feels like his right eye vision may be worse. No pain or redness. Since last visit he underwent  vitrectomy and explantation of dislocated PC IOL and implantation of AC IOL in the right eye 09/05/23.     Using timolol daily OS and Vyzulta at bedtime OU. Having trouble getting refills of Vyzulta. Currently tapering prednisolone, using BID right eye at this time.     Relevant Past Medical/Family/Social History: father AMD, DM    Relevant Review of Systems: negative     Diagnosis: Primary open angle glaucoma   Year diagnosis: 1990s  Previous glaucoma surgery/laser:   2000 CEIOL OU Dr. Yanes   2002 SLT OU   2006 trab [facility] with vitrectomy OD Dr. Burntet   2007 secondary IOL OD Dr. Burnett   2011 SLT OS Dr. Burnett   2014 bleb revision OD Dr. Burnett   05/26/2023: SLT left eye  Maximum intraocular pressure 33/44  Currently Meds:    timolol QAM OS   vyzulta QHS OU  Family history: negative  CCT: 609/559  Refractive status: Axial myopia (32mm OD)   Trauma history: negative  Steroid exposure: negative  Vasospastic disease: Migrane/Raynaud phenomenon: negative  A past hemodynamic crisis or Low BP:: negative  Meds AEs/intolerance: brimonidine, brinzolamide, dorzolamide, rocklatan, rhopressa  PMHx: Asthma and respiratory problems/Cardiac/Renal/Kidney stones/Sulfa Allergy  Anticoagulants: None  Prior Testing:  OCT Optic Nerve RNFL Spectralis October 13, 2022  right eye: poor quality, macula with thickening from ERM  left eye: poor quality, macula with thickening from  ERM    Today's testing:   IOP: 12/12 mmHg applanation   Rare cell only left eye  AC IOL right eye, well centered  Visual Field 10/5/23   Right eye: LVC, generalized depression with a central island of vision, 7/13 FN   Left eye: 24-2, dense superior depression, and inferior depression, 3/6 FN    Additional Ocular History:   2. Aphakia left eye     3. Inferotemporal PCIOL subluxation right eye     4. ERM each eye   5. History of Hypotony right eye     Plan/Recommendations:  Discussed findings with patient. Glaucoma in the setting of high myopia. S/p Trab right eye and on drops left eye. Progression on left eye VF 4/27/23. S/p SLT in the left eye 5/26/23.   S/p right eye vitrectomy, subluxed IOL explantation, and AC IOL implantation with Dr. Griffiths on 9/5/23.   IOP stable each eye at 12/12 mmHg. VF worse today but unreliable.   Continue Timolol QAM left eye   Continue Vyzulta QHS both eyes    RTC: 4  Months VA, IOP, VF       Physician Attestation     Attending Physician Attestation:  Complete documentation of historical and exam elements from today's encounter can be found in the full encounter summary report (not reduplicated in this progress note). I personally obtained the chief complaint(s) and history of present illness. I confirmed and edited as necessary the review of systems, past medical/surgical history, family history, social history, and examination findings as documented by others; and I examined the patient myself. I personally reviewed the relevant tests, images, and reports as documented above. I personally reviewed the ophthalmic test(s) associated with this encounter, agree with the interpretation(s) as documented by the resident/fellow and have edited the corresponding report(s) as necessary. I formulated and edited as necessary the assessment and plan and discussed the findings and management plan with the patient and any family members present at the time of the visit.  Hammad Moya M.D.,  Glaucoma, October 5, 2023

## 2023-10-05 ENCOUNTER — OFFICE VISIT (OUTPATIENT)
Dept: OPHTHALMOLOGY | Facility: CLINIC | Age: 83
End: 2023-10-05
Attending: OPHTHALMOLOGY
Payer: COMMERCIAL

## 2023-10-05 DIAGNOSIS — H35.373 EPIRETINAL MEMBRANE (ERM) OF BOTH EYES: ICD-10-CM

## 2023-10-05 DIAGNOSIS — Z48.810 AFTERCARE FOLLOWING SURGERY OF A SENSORY ORGAN: Primary | ICD-10-CM

## 2023-10-05 DIAGNOSIS — H40.1133 PRIMARY OPEN ANGLE GLAUCOMA (POAG) OF BOTH EYES, SEVERE STAGE: Primary | ICD-10-CM

## 2023-10-05 PROCEDURE — 92083 EXTENDED VISUAL FIELD XM: CPT | Performed by: OPHTHALMOLOGY

## 2023-10-05 PROCEDURE — 92134 CPTRZ OPH DX IMG PST SGM RTA: CPT | Performed by: OPHTHALMOLOGY

## 2023-10-05 PROCEDURE — 999N000103 HC STATISTIC NO CHARGE FACILITY FEE

## 2023-10-05 PROCEDURE — G0463 HOSPITAL OUTPT CLINIC VISIT: HCPCS | Mod: 27 | Performed by: OPHTHALMOLOGY

## 2023-10-05 PROCEDURE — G0463 HOSPITAL OUTPT CLINIC VISIT: HCPCS | Performed by: OPHTHALMOLOGY

## 2023-10-05 PROCEDURE — 99207 PR BUNDLED PROCEDURE IN GLOBAL PKG: CPT | Mod: 26 | Performed by: OPHTHALMOLOGY

## 2023-10-05 PROCEDURE — 92012 INTRM OPH EXAM EST PATIENT: CPT | Mod: GC | Performed by: OPHTHALMOLOGY

## 2023-10-05 PROCEDURE — 99024 POSTOP FOLLOW-UP VISIT: CPT | Mod: GC | Performed by: OPHTHALMOLOGY

## 2023-10-05 RX ORDER — MOXIFLOXACIN 5 MG/ML
1 SOLUTION/ DROPS OPHTHALMIC 4 TIMES DAILY
Qty: 0.6 ML | Refills: 0 | Status: SHIPPED | OUTPATIENT
Start: 2023-10-05 | End: 2023-10-08

## 2023-10-05 RX ORDER — LATANOPROSTENE BUNOD 0.24 MG/ML
1 SOLUTION/ DROPS OPHTHALMIC AT BEDTIME
Qty: 5 ML | Refills: 5 | Status: SHIPPED | OUTPATIENT
Start: 2023-10-05 | End: 2024-02-07

## 2023-10-05 ASSESSMENT — TONOMETRY
IOP_METHOD: TONOPEN
IOP_METHOD: APPLANATION
OD_IOP_MMHG: 12
IOP_METHOD: TONOPEN
OD_IOP_MMHG: 11
OD_IOP_MMHG: 11
OS_IOP_MMHG: 13
OS_IOP_MMHG: 12
OS_IOP_MMHG: 13

## 2023-10-05 ASSESSMENT — REFRACTION_WEARINGRX
OS_CYLINDER: +1.75
OS_AXIS: 150
OS_ADD: +2.50
OD_CYLINDER: SPHERE
SPECS_TYPE: BIFOCAL
OS_ADD: +2.50
OD_ADD: +2.50
OS_AXIS: 150
OS_CYLINDER: +1.75
OD_CYLINDER: SPHERE
OS_SPHERE: PLANO
OD_ADD: +2.50
SPECS_TYPE: BIFOCAL
OD_SPHERE: PLANO
OD_SPHERE: PLANO
OS_SPHERE: PLANO

## 2023-10-05 ASSESSMENT — VISUAL ACUITY
OD_SC: 3'/200E
OD_PH_SC: 20/600
METHOD: SNELLEN - LINEAR
OD_CC: 3'/200E
OS_CC: 20/25
OD_PH_CC: 20/600
OS_SC+: -2
CORRECTION_TYPE: GLASSES
OS_SC: 20/25
OS_CC+: -3
METHOD: SNELLEN - LINEAR

## 2023-10-05 ASSESSMENT — CONF VISUAL FIELD
COMMENTS: VF TODAY
COMMENTS: VF TODAY

## 2023-10-05 ASSESSMENT — SLIT LAMP EXAM - LIDS
COMMENTS: NORMAL
COMMENTS: NORMAL
COMMENTS: MGD

## 2023-10-05 NOTE — NURSING NOTE
Chief Complaints and History of Present Illnesses   Patient presents with    Post Op (Ophthalmology) Right Eye     : Right eye Vitrectomy parsplana with 25 gauge system, explantation of sulcus intraocular lens, implantation of anterior chamber intraocular lens 9/5/23     Chief Complaint(s) and History of Present Illness(es)       Post Op (Ophthalmology) Right Eye              Comments: : Right eye Vitrectomy parsplana with 25 gauge system, explantation of sulcus intraocular lens, implantation of anterior chamber intraocular lens 9/5/23              Comments    Pt states no change in VA since last visit   Pt states no flashes or floaters   No eye pain or headaches     Clarisa Barahona COT 8:06 AM October 5, 2023

## 2023-10-05 NOTE — PROGRESS NOTES
CC: POM1 status post posterior chamber intraocular lens (PCIOL) removal/ Anterior chamber intraocular lens  placement 25 g Pars plana vitrectomy (PPV)/ Pars plana lensectomy (removal of lens material and dense/ calcified capsule)  RIGHT EYE 9.5.23  For dislocated intraocular lens   Reports still blurry vision  Retina attached  Doing well  Anterior chamber intraocular lens  in good position    Past Ocular Surgery  2000 CEIOL OU Dr. Yanes  2002 SLT OU  2006 trab [facility] with vitrectomy OD Dr. Burnett  2007 secondary IOL OD Dr. Burnett  2011 SLT OS Dr. Burnett  2014 bleb revision OD Dr. Burnett    Imaging:  OCT 10/05/23   OD- Subretinal fibrosis, ERM, subfoveal atrophy, lamellar hole, temporal macular schisis and outer segment atrophy, thin choroid - stable  OS-Epiretinal membrane with mild foveal traction, foveal contour present, myopic contour of fundus, no fluid, PEDs,     FAF 07/26/23   OD-Hypo-AF involving fovea with some hyper-AF along the borders; peripheral hypoautofluorescence nasally  OS-PPA; scattered hypo- and hyper-AF lesions    Slit Lamp 03/20/23  OD-Inferotemporal subluxation of 3-piece IOL    Fundus Photos 07/26/23   OU consistent with exam    Assessment/plan:    #  status post posterior chamber intraocular lens (PCIOL) removal/ Anterior chamber intraocular lens  placement 25 g Pars plana vitrectomy (PPV)/ Pars plana lensectomy (removal of lens material and dense/ calcified capsule)  RIGHT EYE 9.5.23  Subluxation of PCIOL OD  Doing well  Anterior chamber intraocular lens  in good position  VA limited because of  pathologic myopia    Cornea suture removal:  1gtt proparacaine given  1gtt of betadine 5% administered  Cornea suture removed without complications  1gtt of betadine 5% administered    Plan:  Vigamox drops four times a day  For 3 days    # Pathologic myopia, OU   -  A/w severe peripapillary and central atrophy OD>>OS   - lattice present OD, s/p laser barricade?    # ERM, OU  Monitor at  this time    # POAG  - on IOP 13/13   - on Timolol QAM OS and Vyzulta QHS OS, no drops OD  - Meds AEs/intolerance: brimonidine, brinzolamide, dorzolamide, rocklatan, rhopressa  - Continue Vyzulta and Timolol left eye- - follows w/ Dr. Moya  - follows w/ Dr. Moya    # ptosis right eye   Chronic; reports 3 prior fail surgeries at [facility]  Plan    PLAN:   Stop eyedrops    PLAN:  Follow up in 4-6 weeks possible K suture removal   Manifest refraction    Kushal Hall MD  PGY-5 Vitreoretina Fellow  [facility]    ~~~~~~~~~~~~~~~~~~~~~~~~~~~~~~~~~~   Complete documentation of historical and exam elements from today's encounter can be found in the full encounter summary report (not reduplicated in this progress note).  I personally obtained the chief complaint(s) and history of present illness.  I confirmed and edited as necessary the review of systems, past medical/surgical history, family history, social history, and examination findings as documented by others; and I examined the patient myself.  I personally reviewed the relevant tests, images, and reports as documented above.  I formulated and edited as necessary the assessment and plan and discussed the findings and management plan with the patient and family    Angelica Griffiths MD  Professor of Ophthalmology  Vitreo-Retinal surgeon   Department of Ophthalmology and Visual Neurosciences   [facility]  Phone: [phone]   Fax: [phone]

## 2023-10-05 NOTE — NURSING NOTE
Chief Complaints and History of Present Illnesses   Patient presents with    Follow Up     Primary open angle glaucoma (POAG) of both eyes,     Chief Complaint(s) and History of Present Illness(es)       Follow Up              Comments: Primary open angle glaucoma (POAG) of both eyes,              Comments    Pt states no change in VA since last visit  Pt states no flashes or floaters   No eye pain or headaches    Clarisa Barahona COT 8:06 AM October 5, 2023

## 2023-12-12 ENCOUNTER — LAB REQUISITION (OUTPATIENT)
Dept: LAB | Facility: CLINIC | Age: 83
End: 2023-12-12
Payer: COMMERCIAL

## 2023-12-12 DIAGNOSIS — F03.90 UNSPECIFIED DEMENTIA, UNSPECIFIED SEVERITY, WITHOUT BEHAVIORAL DISTURBANCE, PSYCHOTIC DISTURBANCE, MOOD DISTURBANCE, AND ANXIETY (H): ICD-10-CM

## 2023-12-12 DIAGNOSIS — I10 ESSENTIAL (PRIMARY) HYPERTENSION: ICD-10-CM

## 2023-12-12 DIAGNOSIS — E78.5 HYPERLIPIDEMIA, UNSPECIFIED: ICD-10-CM

## 2023-12-13 PROCEDURE — 36415 COLL VENOUS BLD VENIPUNCTURE: CPT | Mod: ORL

## 2023-12-13 PROCEDURE — P9603 ONE-WAY ALLOW PRORATED MILES: HCPCS | Mod: ORL

## 2023-12-13 PROCEDURE — 84443 ASSAY THYROID STIM HORMONE: CPT | Mod: ORL

## 2023-12-13 PROCEDURE — 80061 LIPID PANEL: CPT | Mod: ORL

## 2023-12-13 PROCEDURE — 82607 VITAMIN B-12: CPT | Mod: ORL

## 2023-12-13 PROCEDURE — 82306 VITAMIN D 25 HYDROXY: CPT | Mod: ORL

## 2023-12-13 PROCEDURE — 80048 BASIC METABOLIC PNL TOTAL CA: CPT | Mod: ORL

## 2023-12-14 LAB
ANION GAP SERPL CALCULATED.3IONS-SCNC: 12 MMOL/L (ref 7–15)
BUN SERPL-MCNC: 11.2 MG/DL (ref 8–23)
CALCIUM SERPL-MCNC: 10.2 MG/DL (ref 8.8–10.2)
CHLORIDE SERPL-SCNC: 97 MMOL/L (ref 98–107)
CHOLEST SERPL-MCNC: 149 MG/DL
CREAT SERPL-MCNC: 0.67 MG/DL (ref 0.67–1.17)
DEPRECATED HCO3 PLAS-SCNC: 28 MMOL/L (ref 22–29)
EGFRCR SERPLBLD CKD-EPI 2021: >90 ML/MIN/1.73M2
FASTING STATUS PATIENT QL REPORTED: NORMAL
GLUCOSE SERPL-MCNC: 228 MG/DL (ref 70–99)
HDLC SERPL-MCNC: 46 MG/DL
LDLC SERPL CALC-MCNC: 89 MG/DL
NONHDLC SERPL-MCNC: 103 MG/DL
POTASSIUM SERPL-SCNC: 4.4 MMOL/L (ref 3.4–5.3)
SODIUM SERPL-SCNC: 137 MMOL/L (ref 135–145)
TRIGL SERPL-MCNC: 70 MG/DL
TSH SERPL DL<=0.005 MIU/L-ACNC: 2.25 UIU/ML (ref 0.3–4.2)
VIT B12 SERPL-MCNC: 378 PG/ML (ref 232–1245)
VIT D+METAB SERPL-MCNC: 21 NG/ML (ref 20–50)

## 2024-01-06 ENCOUNTER — HEALTH MAINTENANCE LETTER (OUTPATIENT)
Age: 84
End: 2024-01-06

## 2024-02-02 DIAGNOSIS — H40.1133 PRIMARY OPEN ANGLE GLAUCOMA (POAG) OF BOTH EYES, SEVERE STAGE: Primary | ICD-10-CM

## 2024-02-06 ENCOUNTER — TELEPHONE (OUTPATIENT)
Dept: OPHTHALMOLOGY | Facility: CLINIC | Age: 84
End: 2024-02-06

## 2024-02-06 ENCOUNTER — MEDICAL CORRESPONDENCE (OUTPATIENT)
Dept: HEALTH INFORMATION MANAGEMENT | Facility: CLINIC | Age: 84
End: 2024-02-06

## 2024-02-06 ENCOUNTER — OFFICE VISIT (OUTPATIENT)
Dept: OPHTHALMOLOGY | Facility: CLINIC | Age: 84
End: 2024-02-06
Attending: OPHTHALMOLOGY
Payer: COMMERCIAL

## 2024-02-06 DIAGNOSIS — H40.1133 PRIMARY OPEN ANGLE GLAUCOMA (POAG) OF BOTH EYES, SEVERE STAGE: ICD-10-CM

## 2024-02-06 PROCEDURE — 92083 EXTENDED VISUAL FIELD XM: CPT | Performed by: OPHTHALMOLOGY

## 2024-02-06 PROCEDURE — G0463 HOSPITAL OUTPT CLINIC VISIT: HCPCS | Performed by: OPHTHALMOLOGY

## 2024-02-06 PROCEDURE — 99214 OFFICE O/P EST MOD 30 MIN: CPT | Mod: GC | Performed by: OPHTHALMOLOGY

## 2024-02-06 ASSESSMENT — TONOMETRY
OS_IOP_MMHG: 16
OD_IOP_MMHG: 18
IOP_METHOD: TONOPEN
OD_IOP_MMHG: 16
IOP_METHOD: APPLANATION
OS_IOP_MMHG: 17

## 2024-02-06 ASSESSMENT — REFRACTION_WEARINGRX
OD_CYLINDER: SPHERE
OS_AXIS: 150
OD_SPHERE: PLANO
SPECS_TYPE: BIFOCAL
OS_CYLINDER: +1.75
OS_ADD: +2.50
OS_SPHERE: PLANO
OD_ADD: +2.50

## 2024-02-06 ASSESSMENT — SLIT LAMP EXAM - LIDS
COMMENTS: NORMAL
COMMENTS: NORMAL

## 2024-02-06 ASSESSMENT — VISUAL ACUITY
OD_PH_CC: 20/400
CORRECTION_TYPE: GLASSES
OD_CC: 4'/200E
OS_CC: 20/25
METHOD: SNELLEN - LINEAR
OS_CC+: +2

## 2024-02-06 NOTE — PATIENT INSTRUCTIONS
Both eyes:  Use Timolol one drop once daily in the morning  Use Vyzulta one drop once daily at bedtime

## 2024-02-06 NOTE — TELEPHONE ENCOUNTER
[facility] Call Center    Phone Message    May a detailed message be left on voicemail: yes     Reason for Call: Medication Question or concern regarding medication   Prescription Clarification  Name of Medication: Timolol, and Vyzulta  Prescribing Provider: Dr. Moya    Pharmacy: [facility] [address] [place] [address]; [place], [place]   Fax: [phone]     What on the order needs clarification? Maureen from [facility] has questions regarding the usage of Timolol and Vyzulta medications that she would like to clarify with the provider. She also states they need a signed order to the existing pharmacy,  Best phone number with any questions is [phone].          Action Taken: Message routed to:  [facility]     Travel Screening: Not Applicable

## 2024-02-06 NOTE — NURSING NOTE
Chief Complaints and History of Present Illnesses   Patient presents with    Glaucoma Follow-Up     Chief Complaint(s) and History of Present Illness(es)       Glaucoma Follow-Up              Laterality: both eyes              Comments    Pt. States that he is doing well. No change in VA BE. No pain or dryness BE. No flashes or floaters BE.   Dimple Pereira COT 8:30 AM February 6, 2024

## 2024-02-06 NOTE — PROGRESS NOTES
2000 CEIOL OU Dr. Yanes  2002 SLT OU  2006 trab [facility] with vitrectomy OD Dr. Burnett  2007 secondary IOL OD Dr. Burnett  2011 SLT OS Dr. Burnett  2014 bleb revision OD Dr. Burnett  2023: SLT left eye    Chief Complaint/Presenting Concern: Glaucoma follow up     History of Present Illness:   Richar Ro is a 83 year old patient who presents for glaucoma follow up.     Today, 02/06/2024, patient presents for 4 month follow up. Recently had covid and pneumonia for which he was hospitalized and in transitional care. He thinks he was getting his eye drops in during this but he did stop the Vyzulta at bedtime, unknown how long he has been off Vyzulta.    Relevant Past Medical/Family/Social History: father AMD, DM    Relevant Review of Systems: negative     Diagnosis: Primary open angle glaucoma   Year diagnosis: 1990s  Previous glaucoma surgery/laser:   2000 CEIOL OU Dr. Yanes   2002 SLT OU   2006 trab [facility] with vitrectomy OD Dr. Burnett   2007 secondary IOL OD Dr. Burnett   2011 SLT OS Dr. Burnett   2014 bleb revision OD Dr. Burnett   05/26/2023: SLT left eye  Maximum intraocular pressure 33/44  Currently Meds:    timolol QAM OS   vyzulta QHS OU  Family history: negative  CCT: 609/559  Refractive status: Axial myopia (32mm OD)   Trauma history: negative  Steroid exposure: negative  Vasospastic disease: Migrane/Raynaud phenomenon: negative  A past hemodynamic crisis or Low BP:: negative  Meds AEs/intolerance: brimonidine, brinzolamide, dorzolamide, rocklatan, rhopressa  PMHx: Asthma and respiratory problems/Cardiac/Renal/Kidney stones/Sulfa Allergy  Anticoagulants: None  Prior Testing:  OCT Optic Nerve RNFL Spectralis October 13, 2022  right eye: poor quality, macula with thickening from ERM  left eye: poor quality, macula with thickening from ERM    Today's testing:   IOP: 16/16 mmHg applanation (off Vyzulta)  AC IOL right eye, well centered  Visual Field 2/6/24  Right eye: LVC, generalized  depression with a central island of vision, appears worse vs fluctuating, unreliable  Left eye: 24-2, dense superior depression, and inferior depression, improved vs fluctuating, unreliable    Additional Ocular History:   2. Aphakia left eye     3. Inferotemporal PCIOL subluxation right eye     4. ERM each eye   5. History of Hypotony right eye     Plan/Recommendations:  Discussed findings with patient. Glaucoma in the setting of high myopia. S/p Trab right eye and on drops left eye. Progression on left eye VF 4/27/23. S/p SLT in the left eye 5/26/23.   S/p right eye vitrectomy, subluxed IOL explantation, and AC IOL implantation with Dr. Griffiths on 9/5/23.   IOP 16/16 mmHg today. VF stable/fluctuating today but highly unreliable. Likely not beneficial to follow VF at this point.  Continue Timolol QAM left eye   Start Timolol QAM right eye  Continue Vyzulta QHS both eyes - emphasized importance, unclear if patient is taking at AL facility but it is on his medication list.    RTC: 4  Months VA, IOP    Mavis Garcia MD  PGY3 Ophthalmology Resident  [facility]      Physician Attestation     Attending Physician Attestation:  Complete documentation of historical and exam elements from today's encounter can be found in the full encounter summary report (not reduplicated in this progress note). I personally obtained the chief complaint(s) and history of present illness. I confirmed and edited as necessary the review of systems, past medical/surgical history, family history, social history, and examination findings as documented by others; and I examined the patient myself. I personally reviewed the relevant tests, images, and reports as documented above. I personally reviewed the ophthalmic test(s) associated with this encounter, agree with the interpretation(s) as documented by the resident/fellow and have edited the corresponding report(s) as necessary. I formulated and edited as necessary the assessment  and plan and discussed the findings and management plan with the patient and any family members present at the time of the visit.  Hammad Moya M.D., Glaucoma, February 6, 2024

## 2024-02-07 ENCOUNTER — TELEPHONE (OUTPATIENT)
Dept: OPHTHALMOLOGY | Facility: CLINIC | Age: 84
End: 2024-02-07
Payer: COMMERCIAL

## 2024-02-07 RX ORDER — LATANOPROSTENE BUNOD 0.24 MG/ML
1 SOLUTION/ DROPS OPHTHALMIC AT BEDTIME
Qty: 5 ML | Refills: 11 | Status: SHIPPED | OUTPATIENT
Start: 2024-02-07

## 2024-02-07 RX ORDER — TIMOLOL MALEATE 5 MG/ML
1 SOLUTION/ DROPS OPHTHALMIC EVERY MORNING
Qty: 10 ML | Refills: 11 | Status: SHIPPED | OUTPATIENT
Start: 2024-02-07

## 2024-02-07 NOTE — TELEPHONE ENCOUNTER
Pt to use Timolol eye drop: one drop in both eyes every morning and Vyzulta: one drop in both eyes at bedtime following visit yesterday.    Rx's updated to include both eye use and sent to pharmacy.    Called care center yesterday and this morning and not able to reach personnel.    Will call again today to review eye drops per request.    Philip Higgins RN 7:29 AM 02/07/24

## 2024-02-07 NOTE — TELEPHONE ENCOUNTER
-Care center called today requesting orders to be faxed    -I Called center also and not able to connect with nursing at 0818    [phone]-- fax number provided    Faxed the eye drop orders with MD signature per request (also sent to pharmacy earlier today)    Philip Higgins RN 8:20 AM 02/07/24

## 2024-02-07 NOTE — TELEPHONE ENCOUNTER
Health Call Center    Phone Message    May a detailed message be left on voicemail: yes     Reason for Call: Order(s): Other:   Reason for requested: Medication orders  Date needed: ASAP  Provider name: Dr. Moya    Pt was in to see Dr. Moya yesterday and his medications were adjusted. Shayla with pt's assisted living facility needs orders sent over reflecting this. Please send to fax # [phone]. If any questions, Shayla can be reached at Ph # [phone]. Thank you.    Action Taken: Message routed to:  [facility]    Travel Screening: Not Applicable

## 2024-02-09 ENCOUNTER — LAB REQUISITION (OUTPATIENT)
Dept: LAB | Facility: CLINIC | Age: 84
End: 2024-02-09
Payer: COMMERCIAL

## 2024-02-09 LAB
ALBUMIN UR-MCNC: NEGATIVE MG/DL
APPEARANCE UR: CLEAR
BILIRUB UR QL STRIP: NEGATIVE
COLOR UR AUTO: YELLOW
GLUCOSE UR STRIP-MCNC: NEGATIVE MG/DL
HGB UR QL STRIP: NEGATIVE
KETONES UR STRIP-MCNC: NEGATIVE MG/DL
LEUKOCYTE ESTERASE UR QL STRIP: NEGATIVE
MUCOUS THREADS #/AREA URNS LPF: PRESENT /LPF
NITRATE UR QL: NEGATIVE
PH UR STRIP: 6 [PH] (ref 5–7)
RBC URINE: <1 /HPF
SP GR UR STRIP: 1.02 (ref 1–1.03)
SQUAMOUS EPITHELIAL: <1 /HPF
UROBILINOGEN UR STRIP-MCNC: NORMAL MG/DL
WBC URINE: <1 /HPF

## 2024-02-09 PROCEDURE — 81001 URINALYSIS AUTO W/SCOPE: CPT | Mod: ORL

## 2024-02-14 ENCOUNTER — TELEPHONE (OUTPATIENT)
Dept: OPHTHALMOLOGY | Facility: CLINIC | Age: 84
End: 2024-02-14
Payer: COMMERCIAL

## 2024-02-14 NOTE — TELEPHONE ENCOUNTER
Orders from last week re-sent-- confirming fax number    Note Dr. Guerra to review ok for self administering drops ok per nursing staff at care facility request    Philip Higgins RN 2:39 PM 02/14/24

## 2024-02-14 NOTE — TELEPHONE ENCOUNTER
[facility]    Phone Message    May a detailed message be left on voicemail: yes     Reason for Call: Other: Maureen with pt's assisted living facility is calling in again because she states that they never received the orders for the eyedrops. Pt is also wanting to put the eyedrops in himself and assisted living facility is wondering if this would be ok. Please resend orders to fax: [phone]. Assisted living staff can be reached at  [phone] - ok to leave VM as it is a secured line. Thank you.     Action Taken: Message routed to:  [facility] EYE    Travel Screening: Not Applicable

## 2024-06-11 ENCOUNTER — OFFICE VISIT (OUTPATIENT)
Dept: OPHTHALMOLOGY | Facility: CLINIC | Age: 84
End: 2024-06-11
Attending: OPHTHALMOLOGY
Payer: COMMERCIAL

## 2024-06-11 DIAGNOSIS — H40.1133 PRIMARY OPEN ANGLE GLAUCOMA (POAG) OF BOTH EYES, SEVERE STAGE: Primary | ICD-10-CM

## 2024-06-11 PROCEDURE — G0463 HOSPITAL OUTPT CLINIC VISIT: HCPCS | Performed by: OPHTHALMOLOGY

## 2024-06-11 PROCEDURE — 99214 OFFICE O/P EST MOD 30 MIN: CPT | Mod: GC | Performed by: OPHTHALMOLOGY

## 2024-06-11 ASSESSMENT — CONF VISUAL FIELD
OS_INFERIOR_NASAL_RESTRICTION: 0
OD_SUPERIOR_NASAL_RESTRICTION: 1
OD_INFERIOR_NASAL_RESTRICTION: 3
OD_SUPERIOR_TEMPORAL_RESTRICTION: 1
OS_NORMAL: 1
OD_INFERIOR_TEMPORAL_RESTRICTION: 1
OS_SUPERIOR_NASAL_RESTRICTION: 0
METHOD: COUNTING FINGERS
OS_SUPERIOR_TEMPORAL_RESTRICTION: 0
OS_INFERIOR_TEMPORAL_RESTRICTION: 0

## 2024-06-11 ASSESSMENT — TONOMETRY
OS_IOP_MMHG: 13
IOP_METHOD: APPLANATION
OD_IOP_MMHG: 10
IOP_METHOD: APPLANATION
OD_IOP_MMHG: 12
OS_IOP_MMHG: 18

## 2024-06-11 ASSESSMENT — SLIT LAMP EXAM - LIDS
COMMENTS: NORMAL
COMMENTS: NORMAL

## 2024-06-11 ASSESSMENT — REFRACTION_WEARINGRX
OD_ADD: +2.50
SPECS_TYPE: BIFOCAL
OS_ADD: +2.50
OS_CYLINDER: +1.75
OS_AXIS: 150
OD_SPHERE: PLANO
OD_CYLINDER: SPHERE
OS_SPHERE: PLANO

## 2024-06-11 ASSESSMENT — VISUAL ACUITY
OS_CC+: -2
OS_CC: 20/25
METHOD: SNELLEN - LINEAR
OD_CC: 4' 200E

## 2024-06-11 NOTE — PROGRESS NOTES
2000 CEIOL OU Dr. Yanes  2002 SLT OU  2006 trab [facility] with vitrectomy OD Dr. Burnett  2007 secondary IOL OD Dr. Burnett  2011 SLT OS Dr. Bunrett  2014 bleb revision OD Dr. Burnett  2023: SLT left eye    Chief Complaint/Presenting Concern: Glaucoma follow up     History of Present Illness:   Richar Ro is a 84 year old patient who presents for glaucoma follow up.     Today, 06/11/2024, patient presents for 4 month follow up. Doing well. Compliant with drops.     Relevant Past Medical/Family/Social History: father AMD, DM    Relevant Review of Systems: negative     Diagnosis: Primary open angle glaucoma   Year diagnosis: 1990s  Previous glaucoma surgery/laser:   2000 CEIOL OU Dr. Yanes   2002 SLT OU   2006 trab [facility] with vitrectomy OD Dr. Burnett   2007 secondary IOL OD Dr. Burnett   2011 SLT OS Dr. Burnett   2014 bleb revision OD Dr. Burnett   05/26/2023: SLT left eye  Maximum intraocular pressure 33/44  Currently Meds:    timolol QAM OS   vyzulta QHS OU  Family history: negative  CCT: 609/559  Refractive status: Axial myopia (32mm OD)   Trauma history: negative  Steroid exposure: negative  Vasospastic disease: Migrane/Raynaud phenomenon: negative  A past hemodynamic crisis or Low BP:: negative  Meds AEs/intolerance: brimonidine, brinzolamide, dorzolamide, rocklatan, rhopressa  PMHx: Asthma and respiratory problems/Cardiac/Renal/Kidney stones/Sulfa Allergy  Anticoagulants: None  Prior Testing:  OCT Optic Nerve RNFL Spectralis October 13, 2022  right eye: poor quality, macula with thickening from ERM  left eye: poor quality, macula with thickening from ERM  Visual Field 2/6/24  Right eye: LVC, generalized depression with a central island of vision, appears worse vs fluctuating, unreliable  Left eye: 24-2, dense superior depression, and inferior depression, improved vs fluctuating, unreliable    Today's testing:  IOP: 12/18 mmHg applanation   AC IOL right eye, well centered    Additional Ocular  History:   2. Aphakia left eye     3. Inferotemporal PCIOL subluxation right eye     4. ERM each eye   5. History of Hypotony right eye     Plan/Recommendations:  Discussed findings with patient. Glaucoma in the setting of high myopia. S/p Trab right eye and on drops left eye. Progression on left eye VF 4/27/23. S/p SLT in the left eye 5/26/23.   S/p right eye vitrectomy, subluxed IOL explantation, and AC IOL implantation with Dr. Griffiths on 9/5/23.   IOP 12/18 mmHg today. VF stable/fluctuating today but highly unreliable. Likely not beneficial to follow VF at this point.  Continue Timolol QAM Both eyes  Continue Vyzulta QHS both eyes - previously unclear if compliant with drops, but patient states improved compliance.   Previously good response to SLT in May 2023, now creeping back up. Recommend repeating SLT left eye. Risk and benefit for SLT including the risk of IOP spike was discussed with the patient.     RTC: SLT next available left eye, VA, IOP    Tino Graham MD  PGY3 Ophthalmology Resident  [facility]      Physician Attestation     Attending Physician Attestation:  Complete documentation of historical and exam elements from today's encounter can be found in the full encounter summary report (not reduplicated in this progress note). I personally obtained the chief complaint(s) and history of present illness. I confirmed and edited as necessary the review of systems, past medical/surgical history, family history, social history, and examination findings as documented by others; and I examined the patient myself. I personally reviewed the relevant tests, images, and reports as documented above. I formulated and edited as necessary the assessment and plan and discussed the findings and management plan with the patient and any family members present at the time of the visit.  Hammad Moya M.D., Glaucoma, June 11, 2024

## 2024-06-11 NOTE — NURSING NOTE
Chief Complaints and History of Present Illnesses   Patient presents with    Glaucoma Follow-Up     Chief Complaint(s) and History of Present Illness(es)       Glaucoma Follow-Up              Laterality: both eyes    Associated symptoms: Negative for dryness, eye pain, flashes, floaters and itching    Pain scale: 0/10              Comments    Richar is here to continue care for primary open angle glaucoma, severe stage if both eyes. Today he states vision is about the same as last visit.    Chaka Baker COT 10:04 AM June 11, 2024

## 2024-08-05 ENCOUNTER — TELEPHONE (OUTPATIENT)
Dept: OPHTHALMOLOGY | Facility: CLINIC | Age: 84
End: 2024-08-05
Payer: COMMERCIAL

## 2024-08-05 NOTE — TELEPHONE ENCOUNTER
[facility] Call Center    Phone Message    May a detailed message be left on voicemail: yes     Reason for Call: Other: Patient would like to reschedule procedure on 8/9 with Dr Moya. Per protocol to send TE.     Please call patient back at [phone]. Thank you.    Action Taken: Message routed to:  [facility]    Travel Screening: Not Applicable

## 2024-08-05 NOTE — TELEPHONE ENCOUNTER
Called and spoke with patient. Rescheduled per patient's request. Now scheduled on 9/27 at 10:15am.     ROMOE Garner 3:52 PM 08/05/2024

## 2024-09-27 ENCOUNTER — OFFICE VISIT (OUTPATIENT)
Dept: OPHTHALMOLOGY | Facility: CLINIC | Age: 84
End: 2024-09-27
Attending: OPHTHALMOLOGY
Payer: COMMERCIAL

## 2024-09-27 DIAGNOSIS — H40.1133 PRIMARY OPEN ANGLE GLAUCOMA (POAG) OF BOTH EYES, SEVERE STAGE: Primary | ICD-10-CM

## 2024-09-27 PROCEDURE — 65855 TRABECULOPLASTY LASER SURG: CPT | Mod: LT | Performed by: OPHTHALMOLOGY

## 2024-09-27 ASSESSMENT — TONOMETRY
OS_IOP_MMHG: 22
OS_IOP_MMHG: 16
IOP_METHOD: TONOPEN
OD_IOP_MMHG: 17
OS_IOP_MMHG: 23
IOP_METHOD: TONOPEN
IOP_METHOD: APPLANATION
OD_IOP_MMHG: 19

## 2024-09-27 ASSESSMENT — REFRACTION_WEARINGRX
OD_ADD: +2.50
OS_ADD: +2.50
OD_SPHERE: PLANO
OS_CYLINDER: +1.75
SPECS_TYPE: BIFOCAL
OS_SPHERE: PLANO
OD_CYLINDER: SPHERE
OS_AXIS: 150

## 2024-09-27 ASSESSMENT — VISUAL ACUITY
METHOD: SNELLEN - LINEAR
OS_CC+: -1
OS_CC: 20/25
OD_CC: 8/200 E
CORRECTION_TYPE: GLASSES

## 2024-09-27 ASSESSMENT — SLIT LAMP EXAM - LIDS
COMMENTS: NORMAL
COMMENTS: NORMAL

## 2024-09-27 NOTE — NURSING NOTE
Chief Complaints and History of Present Illnesses   Patient presents with    Follow Up     Primary open angle glaucoma     Chief Complaint(s) and History of Present Illness(es)       Follow Up              Laterality: both eyes    Course: stable    Associated symptoms: Negative for dryness, eye pain, redness and photophobia    Treatments tried: eye drops    Pain scale: 0/10    Comments: Primary open angle glaucoma              Comments    He states that his vision seems stable since his last exam.  Patient denies having any eye discomfort.    He is using:    Timolol QAM both eyes    Vyzulta QHS both eyes (misses using this drop 1-2x per week)    ROMEO Staples 10:03 AM  September 27, 2024

## 2024-09-27 NOTE — PROGRESS NOTES
2000 CEIOL OU Dr. Yanes  2002 SLT OU  2006 trab [facility] with vitrectomy OD Dr. Burnett  2007 secondary IOL OD Dr. Burnett  2011 SLT OS Dr. Burnett  2014 bleb revision OD Dr. Burnett  2023: SLT left eye    Chief Complaint/Presenting Concern: Glaucoma follow up     History of Present Illness:   Richar Ro is a 84 year old patient who presents for glaucoma follow up.     Today, 09/27/2024, patient presents for repeat SLT laser left eye     Relevant Past Medical/Family/Social History: father AMD, DM    Relevant Review of Systems: negative     Diagnosis: Primary open angle glaucoma   Year diagnosis: 1990s  Previous glaucoma surgery/laser:   2000 CEIOL OU Dr. Yanes   2002 SLT OU   2006 trab [facility] with vitrectomy OD Dr. Burnett   2007 secondary IOL OD Dr. Burnett   2011 SLT OS Dr. Burnett   2014 bleb revision OD Dr. Burnett   05/26/2023: SLT left eye  Maximum intraocular pressure 33/44  Currently Meds:    timolol QAM OS   vyzulta QHS OU  Family history: negative  CCT: 609/559  Refractive status: Axial myopia (32mm OD)   Trauma history: negative  Steroid exposure: negative  Vasospastic disease: Migrane/Raynaud phenomenon: negative  A past hemodynamic crisis or Low BP:: negative  Meds AEs/intolerance: brimonidine, brinzolamide, dorzolamide, rocklatan, rhopressa  PMHx: Asthma and respiratory problems/Cardiac/Renal/Kidney stones/Sulfa Allergy  Anticoagulants: None  Prior Testing:  OCT Optic Nerve RNFL Spectralis October 13, 2022  right eye: poor quality, macula with thickening from ERM  left eye: poor quality, macula with thickening from ERM  Visual Field 2/6/24  Right eye: LVC, generalized depression with a central island of vision, appears worse vs fluctuating, unreliable  Left eye: 24-2, dense superior depression, and inferior depression, improved vs fluctuating, unreliable    Additional Ocular History:   2. Aphakia left eye     3. Inferotemporal PCIOL subluxation right eye     4. ERM each eye   5.  History of Hypotony right eye     Plan/Recommendations:  Discussed findings with patient. Glaucoma in the setting of high myopia.  Previously good response to SLT. Now s/p SLT 09/27/24 in right eye without complication.  Continue previous medications: Timolol QAM OU. Vyzulta QHS OU.    RTC: Follow up 1 week Tyrel, 6 weeks with Nita.    Chintan Reinoso MD  PGY-3, Ophthalmology  [facility]    Physician Attestation     Attending Physician Attestation:  Complete documentation of historical and exam elements from today's encounter can be found in the full encounter summary report (not reduplicated in this progress note). I personally obtained the chief complaint(s) and history of present illness. I confirmed and edited as necessary the review of systems, past medical/surgical history, family history, social history, and examination findings as documented by others; and I examined the patient myself. I personally reviewed the relevant tests, images, and reports as documented above. I formulated and edited as necessary the assessment and plan and discussed the findings and management plan with the patient and any family members present at the time of the visit.  Hammad Moya M.D., Glaucoma, 09/27/24

## 2024-09-27 NOTE — PATIENT INSTRUCTIONS
Continue Timolol QAM Both eyes  Continue Vyzulta QHS Both eyes    Follow up in 1 week with Dr. Sarah  Follow up in 6 weeks with Dr. Moya  Follow up at the next available procedure date for SLT laser in the left eye

## 2024-10-03 ENCOUNTER — OFFICE VISIT (OUTPATIENT)
Dept: OPHTHALMOLOGY | Facility: CLINIC | Age: 84
End: 2024-10-03
Payer: COMMERCIAL

## 2024-10-03 DIAGNOSIS — H40.1133 PRIMARY OPEN ANGLE GLAUCOMA (POAG) OF BOTH EYES, SEVERE STAGE: Primary | ICD-10-CM

## 2024-10-03 PROCEDURE — 99024 POSTOP FOLLOW-UP VISIT: CPT

## 2024-10-03 PROCEDURE — G0463 HOSPITAL OUTPT CLINIC VISIT: HCPCS

## 2024-10-03 ASSESSMENT — TONOMETRY
OS_IOP_MMHG: 19
OD_IOP_MMHG: 14
IOP_METHOD: TONOPEN
OD_IOP_MMHG: 15
OS_IOP_MMHG: 19
IOP_METHOD: APPLANATION

## 2024-10-03 ASSESSMENT — REFRACTION_WEARINGRX
OS_SPHERE: PLANO
OS_CYLINDER: +1.75
OD_SPHERE: PLANO
OS_AXIS: 150
SPECS_TYPE: BIFOCAL
OD_CYLINDER: SPHERE
OS_ADD: +2.50
OD_ADD: +2.50

## 2024-10-03 ASSESSMENT — VISUAL ACUITY
OS_CC+: -2
METHOD: SNELLEN - LINEAR
OD_CC: 20/600
CORRECTION_TYPE: GLASSES
OS_CC: 20/20

## 2024-10-03 ASSESSMENT — CONF VISUAL FIELD
OD_SUPERIOR_TEMPORAL_RESTRICTION: 1
OD_INFERIOR_TEMPORAL_RESTRICTION: 1
OD_INFERIOR_NASAL_RESTRICTION: 3
OD_SUPERIOR_NASAL_RESTRICTION: 1

## 2024-10-03 ASSESSMENT — SLIT LAMP EXAM - LIDS
COMMENTS: NORMAL
COMMENTS: NORMAL

## 2024-10-03 NOTE — PROGRESS NOTES
2000 CEIOL OU Dr. Yanes  2002 SLT OU  2006 trab [facility] with vitrectomy OD Dr. Burnett  2007 secondary IOL OD Dr. Burnett  2011 SLT OS Dr. Burnett  2014 bleb revision OD Dr. Burnett  2023: SLT left eye    Chief Complaint/Presenting Concern: Glaucoma follow up     History of Present Illness:   Richar Ro is a 84 year old patient who presents for glaucoma follow up.     Today, 10/03/2024, patient presents s/p SLT laser left eye 09/27/2024. No Visual or Ocular Changes.    Current Medications:  Timolol QAM each eye  Vyzulta every day each eye     Relevant Past Medical/Family/Social History: father AMD, DM    Relevant Review of Systems: negative     Diagnosis: Primary open angle glaucoma   Year diagnosis: 1990s  Previous glaucoma surgery/laser:   2000 CEIOL OU Dr. Yanes   2002 SLT OU   2006 trab [facility] with vitrectomy OD Dr. Burnett   2007 secondary IOL OD Dr. Burnett   2011 SLT OS Dr. Burnett   2014 bleb revision OD Dr. Burnett   05/26/2023: SLT left eye   09/27/2024: SLT left eye   Maximum intraocular pressure 33/44  Currently Meds:    timolol QAM OS   vyzulta QHS OU  Family history: negative  CCT: 609/559  Refractive status: Axial myopia (32mm OD)   Trauma history: negative  Steroid exposure: negative  Vasospastic disease: Migrane/Raynaud phenomenon: negative  A past hemodynamic crisis or Low BP:: negative  Meds AEs/intolerance: brimonidine, brinzolamide, dorzolamide, rocklatan, rhopressa  PMHx: Asthma and respiratory problems/Cardiac/Renal/Kidney stones/Sulfa Allergy  Anticoagulants: None  Prior Testing:  OCT Optic Nerve RNFL Spectralis October 13, 2022  right eye: poor quality, macula with thickening from ERM  left eye: poor quality, macula with thickening from ERM  Visual Field 2/6/24  Right eye: LVC, generalized depression with a central island of vision, appears worse vs fluctuating, unreliable  Left eye: 24-2, dense superior depression, and inferior depression, improved vs fluctuating,  unreliable    Additional Ocular History:   2. Aphakia left eye     3. Inferotemporal PCIOL subluxation right eye     4. ERM each eye   5. History of Hypotony right eye     Plan/Recommendations:  Discussed findings with patient. Glaucoma in the setting of high myopia.  Previously good response to SLT.   S/p repeat 09/27/24 SLT left eye. IOP Lower today.  Discussed that it can take 6 weeks for full effect of SLT.  Continue Timolol QAM OU.   Continue Vyzulta QHS OU.    RTC: Follow up 5 weeks with Nita for VA and IOP    Complete documentation of historical and exam elements from today's encounter can be found in the full encounter summary report (not reduplicated in this progress note). I personally obtained the chief complaint(s) and history of present illness. I confirmed and edited as necessary the review of systems, past medical/surgical history, family history, social history, and examination findings as document by others; and I examined the patient myself. I personally reviewed the relevant tests, images, and reports as documented above. I formulated and edited as necessary the assessment and plan and discussed the findings and management plan with the patient and family.    Fabricio Sarah, OD

## 2024-10-03 NOTE — PATIENT INSTRUCTIONS
Continue Timolol QAM Both eyes  Continue Vyzulta QHS Both eyes    Follow up in 6 weeks with Dr. Moya

## 2024-11-07 ENCOUNTER — OFFICE VISIT (OUTPATIENT)
Dept: OPHTHALMOLOGY | Facility: CLINIC | Age: 84
End: 2024-11-07
Attending: OPHTHALMOLOGY
Payer: COMMERCIAL

## 2024-11-07 DIAGNOSIS — H40.1133 PRIMARY OPEN ANGLE GLAUCOMA (POAG) OF BOTH EYES, SEVERE STAGE: Primary | ICD-10-CM

## 2024-11-07 PROCEDURE — G0463 HOSPITAL OUTPT CLINIC VISIT: HCPCS | Performed by: OPHTHALMOLOGY

## 2024-11-07 PROCEDURE — 99214 OFFICE O/P EST MOD 30 MIN: CPT | Performed by: OPHTHALMOLOGY

## 2024-11-07 ASSESSMENT — REFRACTION_WEARINGRX
OS_SPHERE: PLANO
OS_ADD: +2.50
OD_ADD: +2.50
OD_CYLINDER: SPHERE
OD_SPHERE: PLANO
SPECS_TYPE: BIFOCAL
OS_CYLINDER: +1.75
OS_AXIS: 150

## 2024-11-07 ASSESSMENT — TONOMETRY
OS_IOP_MMHG: 21
OD_IOP_MMHG: 19
IOP_METHOD: APPLANATION

## 2024-11-07 ASSESSMENT — VISUAL ACUITY
OS_CC+: -1
METHOD: SNELLEN - LINEAR
OD_CC: 20/600
OS_CC: 20/25
CORRECTION_TYPE: GLASSES

## 2024-11-07 ASSESSMENT — SLIT LAMP EXAM - LIDS
COMMENTS: NORMAL
COMMENTS: NORMAL

## 2024-11-07 NOTE — NURSING NOTE
Chief Complaints and History of Present Illnesses   Patient presents with    Glaucoma Follow-Up     Chief Complaint(s) and History of Present Illness(es)       Glaucoma Follow-Up              Laterality: both eyes              Comments    Pt. States that he is doing well. No change in VA BE. No pain BE. No flashes or floaters BE.   Dimple Pereira COT 8:31 AM November 7, 2024

## 2024-11-07 NOTE — PATIENT INSTRUCTIONS
Increase Timolol to twice daily in Both eyes  Continue Vyzulta QHS Both eyes    At night when you place both drops in the eyes keep 5 mins between each drop

## 2024-11-07 NOTE — PROGRESS NOTES
2000 CEIOL OU Dr. Yanes  2002 SLT OU  2006 trab [facility] with vitrectomy OD Dr. Burnett  2007 secondary IOL OD Dr. Burnett  2011 SLT OS Dr. Burnett  2014 bleb revision OD Dr. Burnett  2023: SLT left eye    Chief Complaint/Presenting Concern: Glaucoma follow up     History of Present Illness:   Richar Ro is a 84 year old patient who presents for glaucoma follow up.     Today, 11/07/2024, patient presents for follow up post repeat SLT laser left eye     Relevant Past Medical/Family/Social History: father AMD, DM    Relevant Review of Systems: negative     Diagnosis: Primary open angle glaucoma , severe stage each eye   Year diagnosis: 1990s  Previous glaucoma surgery/laser:   2000 CEIOL OU Dr. Yanes   2002 SLT OU   2006 trab [facility] with vitrectomy OD Dr. Burnett   2007 secondary IOL OD Dr. Burnett   2011 SLT OS Dr. Burnett   2014 bleb revision OD Dr. Burnett   05/26/2023: SLT left eye  Maximum intraocular pressure 33/44  Currently Meds:    timolol QAM OS   vyzulta QHS OU  Family history: negative  CCT: 609/559  Refractive status: Axial myopia (32mm OD)   Trauma history: negative  Steroid exposure: negative  Vasospastic disease: Migrane/Raynaud phenomenon: negative  A past hemodynamic crisis or Low BP:: negative  Meds AEs/intolerance: brimonidine, brinzolamide, dorzolamide, rocklatan, rhopressa  Anticoagulants: None  Prior Testing:  OCT Optic Nerve RNFL Spectralis October 13, 2022  right eye: poor quality, macula with thickening from ERM  left eye: poor quality, macula with thickening from ERM  Visual Field 2/6/24  Right eye: LVC, generalized depression with a central island of vision, appears worse vs fluctuating, unreliable  Left eye: 24-2, dense superior depression, and inferior depression, improved vs fluctuating, unreliable    Additional Ocular History:   2. Aphakia left eye     3. Inferotemporal PCIOL subluxation right eye     4. ERM each eye   5. History of Hypotony right eye      Plan/Recommendations:  Discussed findings with patient. Glaucoma in the setting of high myopia.  Previously good response to SLT. Now s/p SLT 09/27/24 in right eye minimal response. He is very high risk for incisional surgery, recommend repeat SLT at this time.   Increase Timolol to BID each eye  Continue Vyzulta QHS each eye .    Schedule SLT left eye       Physician Attestation     Attending Physician Attestation:  Complete documentation of historical and exam elements from today's encounter can be found in the full encounter summary report (not reduplicated in this progress note). I personally obtained the chief complaint(s) and history of present illness. I confirmed and edited as necessary the review of systems, past medical/surgical history, family history, social history, and examination findings as documented by others; and I examined the patient myself. I personally reviewed the relevant tests, images, and reports as documented above. I formulated and edited as necessary the assessment and plan and discussed the findings and management plan with the patient and any family members present at the time of the visit.  Hammad Moya M.D., Glaucoma, November 7, 2024

## 2024-11-08 ENCOUNTER — TELEPHONE (OUTPATIENT)
Dept: OPHTHALMOLOGY | Facility: CLINIC | Age: 84
End: 2024-11-08
Payer: COMMERCIAL

## 2024-11-08 NOTE — TELEPHONE ENCOUNTER
Called and spoke with patient's wife, Carol Ann. Rescheduled patient to 12/27.     Radha Lozada, COT 11:26 AM 11/08/2024

## 2024-11-08 NOTE — TELEPHONE ENCOUNTER
[facility] Call Center    Phone Message    May a detailed message be left on voicemail: yes     Reason for Call: Other: pt is scheduled with Dr Moya for a procedure on 12 /13/24 and is needing to R/S writer is unable to schedule per guidelines please contact pts wife Adair to discuss options Thank you clinic can speak with pt also      Action Taken: Message routed to:  [facility]    Travel Screening: Not Applicable     Date of Service:

## 2025-02-06 ENCOUNTER — OFFICE VISIT (OUTPATIENT)
Dept: OPHTHALMOLOGY | Facility: CLINIC | Age: 85
End: 2025-02-06
Attending: OPHTHALMOLOGY
Payer: COMMERCIAL

## 2025-02-06 DIAGNOSIS — H40.1133 PRIMARY OPEN ANGLE GLAUCOMA (POAG) OF BOTH EYES, SEVERE STAGE: Primary | ICD-10-CM

## 2025-02-06 PROCEDURE — G0463 HOSPITAL OUTPT CLINIC VISIT: HCPCS | Performed by: OPHTHALMOLOGY

## 2025-02-06 RX ORDER — DORZOLAMIDE HYDROCHLORIDE AND TIMOLOL MALEATE PRESERVATIVE FREE 20; 5 MG/ML; MG/ML
1 SOLUTION/ DROPS OPHTHALMIC 2 TIMES DAILY
Qty: 60 EACH | Refills: 3 | Status: SHIPPED | OUTPATIENT
Start: 2025-02-06

## 2025-02-06 ASSESSMENT — REFRACTION_WEARINGRX
OS_CYLINDER: +1.75
OD_SPHERE: PLANO
OS_AXIS: 150
OS_ADD: +2.50
SPECS_TYPE: BIFOCAL
OD_CYLINDER: SPHERE
OD_ADD: +2.50
OS_SPHERE: PLANO

## 2025-02-06 ASSESSMENT — SLIT LAMP EXAM - LIDS
COMMENTS: NORMAL
COMMENTS: NORMAL

## 2025-02-06 ASSESSMENT — TONOMETRY
IOP_METHOD: TONOPEN
OD_IOP_MMHG: 15
OS_IOP_MMHG: 20
OD_IOP_MMHG: 18
IOP_METHOD: APPLANATION
OS_IOP_MMHG: 20

## 2025-02-06 ASSESSMENT — VISUAL ACUITY
CORRECTION_TYPE: GLASSES
OD_CC: 20/500
METHOD: SNELLEN - LINEAR
OS_CC: 20/20
OS_CC+: -2

## 2025-02-06 NOTE — PROGRESS NOTES
2000 CEIOL OU Dr. Yanes  2002 SLT OU  2006 trab [facility] with vitrectomy OD Dr. Hortencia Hughes secondary IOL OD Dr. Burnett  2011 SLT OS Dr. Burnett  2014 bleb revision OD Dr. Burnett  2023: SLT left eye  2024: SLT left eye 9/27/24     Chief Complaint/Presenting Concern: Glaucoma follow up     History of Present Illness:   Richar Ro is a 84 year old patient who presents for glaucoma follow up.     Today, 02/06/2025, patient presents for follow up SLT left eye.    Relevant Past Medical/Family/Social History: father AMD, DM    Relevant Review of Systems: negative     Diagnosis: Primary open angle glaucoma , severe stage each eye   Year diagnosis: 1990s  Previous glaucoma surgery/laser:   2000 CEIOL OU Dr. Yanes   2002 SLT OU   2006 trab [facility] with vitrectomy OD Dr. Burnett   2007 secondary IOL OD Dr. Burnett   2011 SLT OS Dr. Burnett   2014 bleb revision OD Dr. Burnett   05/26/2023: SLT left eye  9/27/24 SLT left eye   12/27/24 SLT left eye   Maximum intraocular pressure 33/44  Currently Meds:    timolol BID OU   vyzulta QHS OU  Family history: negative  CCT: 609/559  Refractive status: Axial myopia (32mm OD)   Trauma history: negative  Steroid exposure: negative  Vasospastic disease: Migrane/Raynaud phenomenon: negative  A past hemodynamic crisis or Low BP:: negative  Meds AEs/intolerance: brimonidine, brinzolamide, dorzolamide, rocklatan, rhopressa  Anticoagulants: None  Prior Testing:  OCT Optic Nerve RNFL Spectralis October 13, 2022  right eye: poor quality, macula with thickening from ERM  left eye: poor quality, macula with thickening from ERM  Visual Field 2/6/24  Right eye: LVC, generalized depression with a central island of vision, appears worse vs fluctuating, unreliable  Left eye: 24-2, dense superior depression, and inferior depression, improved vs fluctuating, unreliable    Additional Ocular History:   2. Aphakia left eye     3. Inferotemporal PCIOL subluxation right eye     4. ERM  each eye   5. History of Hypotony right eye     Plan/Recommendations:  Discussed findings with patient. Glaucoma in the setting of high myopia.  Previously good response to SLT. Now s/p SLT 09/27/24 in right eye minimal response. He is very high risk for incisional surgery, recommended repeat SLT   SLT left eye 12/27/24 IOP not at goal. However he is very high risk for surgery and his left eye is his better seeing eye therefore do not recommend any incisional surgery.  Switch from Timolol BID each eye to Cosopt PF BID each eye, if develops allergy then will go back to timolol  Continue Vyzulta QHS each eye .    RTC 4 months, VA, IOP    Eric De La Torre MD  Resident Physician, PGY-3  Department of Ophthalmology       Physician Attestation     Attending Physician Attestation:  Complete documentation of historical and exam elements from today's encounter can be found in the full encounter summary report (not reduplicated in this progress note). I personally obtained the chief complaint(s) and history of present illness. I confirmed and edited as necessary the review of systems, past medical/surgical history, family history, social history, and examination findings as documented by others; and I examined the patient myself. I personally reviewed the relevant tests, images, and reports as documented above.  I formulated and edited as necessary the assessment and plan and discussed the findings and management plan with the patient and any family members present at the time of the visit.  Hammad Moya M.D., Glaucoma, February 6, 2025

## 2025-02-06 NOTE — NURSING NOTE
Chief Complaints and History of Present Illnesses   Patient presents with    Glaucoma Follow-Up     Chief Complaint(s) and History of Present Illness(es)       Glaucoma Follow-Up              Laterality: both eyes              Comments    Pt. States that VA seems to have worsened RE. No pain BE. No change in VA LE. No flashes or floaters BE.   Dimple Pereira COT 12:20 PM February 6, 2025

## (undated) DEVICE — EYE KNIFE SLIT XSTAR VISITEC 2.8MM 45DEG 373728

## (undated) DEVICE — Device

## (undated) DEVICE — EYE LENS GLIDE 581033

## (undated) DEVICE — EYE BLADE SCLERAL MVR 20GA 8065912001

## (undated) DEVICE — GLOVE BIOGEL PI MICRO SZ 6.0 48560

## (undated) DEVICE — EYE MARKING PAD 581057

## (undated) DEVICE — TAPE MICROPORE 2"X1.5YD 1530S-2

## (undated) DEVICE — EYE NDL RETROBULBAR ATKINSON 25GA 1.5" 581637

## (undated) DEVICE — SU VICRYL 7-0 TG140-8DA 18" J546G

## (undated) DEVICE — EYE CANN IRR 27GA ANTERIOR CHAMBER 581280

## (undated) DEVICE — LINEN TOWEL PACK X5 5464

## (undated) DEVICE — SU ETHILON 10-0 CS160-6 12" 9000G

## (undated) DEVICE — EYE PACK 25GA CONSTELLATION 10,000 CPM PPK9380-02

## (undated) DEVICE — EYE CANN SOFT TIP 25GA FOR VALVED SET 8065149530

## (undated) DEVICE — PACK VITRECTOMY/RET CUSTOM ASC PQ15VRU12

## (undated) DEVICE — EYE SHIELD PLASTIC

## (undated) DEVICE — EYE PACK CONSTELLATION FRAGMATOME 20GA 8065750958

## (undated) DEVICE — EYE DRAPE MICROSCOPE UNIVERSAL (BIOM FULL) 08-MK55140

## (undated) DEVICE — SOL WATER IRRIG 500ML BOTTLE 2F7113

## (undated) DEVICE — TAPE MICROPORE 1"X1.5YD 1530S-1

## (undated) DEVICE — EYE KNIFE STILETTO VISITEC 1.1MM ANG 45DEG SIDEPORT 376620

## (undated) DEVICE — SYR 01ML 27GA 0.5" ECLIPSE 305789

## (undated) DEVICE — SU PLAIN 6-0 TG140-8 18" 1735G

## (undated) RX ORDER — DEXAMETHASONE SODIUM PHOSPHATE 4 MG/ML
INJECTION, SOLUTION INTRA-ARTICULAR; INTRALESIONAL; INTRAMUSCULAR; INTRAVENOUS; SOFT TISSUE
Status: DISPENSED
Start: 2023-09-05

## (undated) RX ORDER — ACETAMINOPHEN 325 MG/1
TABLET ORAL
Status: DISPENSED
Start: 2023-09-05

## (undated) RX ORDER — LIDOCAINE HYDROCHLORIDE AND EPINEPHRINE 10; 10 MG/ML; UG/ML
INJECTION, SOLUTION INFILTRATION; PERINEURAL
Status: DISPENSED
Start: 2023-09-05